# Patient Record
Sex: FEMALE | Race: WHITE | NOT HISPANIC OR LATINO | Employment: FULL TIME | URBAN - METROPOLITAN AREA
[De-identification: names, ages, dates, MRNs, and addresses within clinical notes are randomized per-mention and may not be internally consistent; named-entity substitution may affect disease eponyms.]

---

## 2018-03-29 ENCOUNTER — OFFICE VISIT (OUTPATIENT)
Dept: OBSTETRICS AND GYNECOLOGY | Facility: CLINIC | Age: 23
End: 2018-03-29

## 2018-03-29 VITALS
WEIGHT: 166.4 LBS | SYSTOLIC BLOOD PRESSURE: 122 MMHG | HEIGHT: 66 IN | DIASTOLIC BLOOD PRESSURE: 64 MMHG | BODY MASS INDEX: 26.74 KG/M2

## 2018-03-29 DIAGNOSIS — Z01.419 ENCOUNTER FOR GYNECOLOGICAL EXAMINATION WITHOUT ABNORMAL FINDING: Primary | ICD-10-CM

## 2018-03-29 DIAGNOSIS — Z87.42 HISTORY OF ABNORMAL CERVICAL PAP SMEAR: ICD-10-CM

## 2018-03-29 PROCEDURE — 99395 PREV VISIT EST AGE 18-39: CPT | Performed by: OBSTETRICS & GYNECOLOGY

## 2018-03-29 RX ORDER — NORETHINDRONE ACETATE AND ETHINYL ESTRADIOL 1.5-30(21)
KIT ORAL DAILY
Refills: 5 | COMMUNITY
Start: 2018-03-02

## 2018-03-29 RX ORDER — SULFAMETHOXAZOLE AND TRIMETHOPRIM 800; 160 MG/1; MG/1
TABLET ORAL
Refills: 3 | COMMUNITY
Start: 2018-02-28 | End: 2022-11-21

## 2018-03-29 NOTE — PROGRESS NOTES
Subjective    Chief Complaint   Patient presents with   • Gynecologic Exam     ae, reg periods, had abnormal pap but never followed up, tired to get preg x 9 months       History of Present Illness    Jessica Chin is a 22 y.o. female who presents for annual exam.  Patient currently on Microgestin by PCP and not attempting pregnancy.  She reportedly had an abnormal Pap smear 3 years ago but didn't  come back for follow-up.  Nonsmoker.      Obstetric History:  OB History     No data available         Menstrual History:     No LMP recorded.       Past Medical History:   Diagnosis Date   • Abnormal Pap smear of cervix      Family History   Problem Relation Age of Onset   • Diabetes Paternal Grandfather    • Diabetes Paternal Grandmother        The following portions of the patient's history were reviewed and updated as appropriate: allergies, current medications, past family history, past medical history, past social history, past surgical history and problem list.    Review of Systems   Constitutional: Negative.  Negative for fever and unexpected weight change.   HENT: Negative.    Respiratory: Negative for shortness of breath and wheezing.    Cardiovascular: Negative for chest pain, palpitations and leg swelling.   Gastrointestinal: Negative for abdominal pain, anal bleeding and blood in stool.   Genitourinary: Negative for dysuria, pelvic pain, urgency, vaginal bleeding, vaginal discharge and vaginal pain.   Skin: Negative.    Neurological: Negative.    Hematological: Negative.  Negative for adenopathy.   Psychiatric/Behavioral: Negative.  Negative for dysphoric mood. The patient is not nervous/anxious.             Objective   Physical Exam   Constitutional: She is oriented to person, place, and time. Vital signs are normal. She appears well-developed and well-nourished.   HENT:   Head: Normocephalic.   Neck: Trachea normal. No tracheal deviation present. No thyromegaly present.   Cardiovascular: Normal rate,  "regular rhythm and normal heart sounds.    No murmur heard.  Pulmonary/Chest: Effort normal and breath sounds normal.   Breasts without masses, tenderness or nipple discharge   Abdominal: Soft. Normal appearance. She exhibits no mass. There is no hepatosplenomegaly. There is no tenderness. No hernia.   Genitourinary: Rectum normal, vagina normal and uterus normal. Uterus is not enlarged and not tender. Cervix exhibits no motion tenderness. Right adnexum displays no mass and no tenderness. Left adnexum displays no mass and no tenderness. No vaginal discharge found.   Genitourinary Comments: External genitalia normal    Lymphadenopathy:     She has no cervical adenopathy.     She has no axillary adenopathy.   Neurological: She is alert and oriented to person, place, and time.   Skin: Skin is warm and dry. No rash noted.   Psychiatric: She has a normal mood and affect. Her behavior is normal. Cognition and memory are normal.       /64   Ht 167.6 cm (66\")   Wt 75.5 kg (166 lb 6.4 oz)   BMI 26.86 kg/m²     Assessment/Plan   Jessica was seen today for gynecologic exam.    Diagnoses and all orders for this visit:    Encounter for gynecological examination without abnormal finding  -     IGP,rfx Aptima HPV All Pth    History of abnormal cervical Pap smear  -     IGP,rfx Aptima HPV All Pth        RTO 6 months     Counseled about the importance of routine Pap smears in follow-up for abnormal Paps.  If this Pap is negative we will do a follow-up in 6 months.  If this is positive this Pap or if last Pap showed high-grade dysplasia she will need a colposcopy.  Also counseled about fertility issues which she currently does not have an really should not be addressed unless she is having problems conceiving.         "

## 2018-04-03 LAB
CONV .: NORMAL
CYTOLOGIST CVX/VAG CYTO: NORMAL
CYTOLOGY CVX/VAG DOC THIN PREP: NORMAL
DX ICD CODE: NORMAL
HIV 1 & 2 AB SER-IMP: NORMAL
OTHER STN SPEC: NORMAL
PATH REPORT.FINAL DX SPEC: NORMAL
STAT OF ADQ CVX/VAG CYTO-IMP: NORMAL

## 2022-09-25 ENCOUNTER — HOSPITAL ENCOUNTER (EMERGENCY)
Facility: HOSPITAL | Age: 27
Discharge: HOME | End: 2022-09-25
Attending: EMERGENCY MEDICINE | Admitting: EMERGENCY MEDICINE

## 2022-09-25 VITALS
SYSTOLIC BLOOD PRESSURE: 135 MMHG | TEMPERATURE: 98.1 F | DIASTOLIC BLOOD PRESSURE: 79 MMHG | HEART RATE: 71 BPM | RESPIRATION RATE: 16 BRPM | WEIGHT: 140.8 LBS | BODY MASS INDEX: 23.46 KG/M2 | OXYGEN SATURATION: 100 % | HEIGHT: 65 IN

## 2022-09-25 DIAGNOSIS — S61.411A LACERATION OF RIGHT PALM, INITIAL ENCOUNTER: Primary | ICD-10-CM

## 2022-09-25 DIAGNOSIS — M79.641 RIGHT HAND PAIN: ICD-10-CM

## 2022-09-25 PROCEDURE — 99282 EMERGENCY DEPT VISIT SF MDM: CPT

## 2022-09-25 NOTE — ED PROVIDER NOTES
Subjective   History of Present Illness  Chief Complaint: Aspiration    Patient is a 27-year-old  female with no significant past medical history presents to the ER with complaints of laceration to the right thumb.  Patient states that she was making breakfast this morning, trying to cut an avocado when the knife slipped in stabbed her in the hand.  She describes as a constant throbbing pain that she rates a 7/10.  She denies any other injury.  Patient has full range of motion of all digits on the right hand.  No numbness tingling.  Patient states that she has had a tetanus shot within the last 5 years.  No other complaints at this time    Location: Right palm    Quality: Throbbing    Duration: Today    Timing: Constant    Severity: Mild to moderate    Associated Symptoms: Laceration    PCP: Sher Hardy    History provided by:  Patient      Review of Systems   Constitutional: Negative for chills and fever.   HENT: Negative for sore throat and trouble swallowing.    Eyes: Negative.    Respiratory: Negative for shortness of breath and wheezing.    Cardiovascular: Negative for chest pain.   Gastrointestinal: Negative for abdominal pain, diarrhea, nausea and vomiting.   Endocrine: Negative.    Genitourinary: Negative for dysuria.   Skin: Positive for wound. Negative for rash.   Allergic/Immunologic: Negative.    Neurological: Negative for headaches.   Psychiatric/Behavioral: Negative for behavioral problems.   All other systems reviewed and are negative.      Past Medical History:   Diagnosis Date   • Abnormal Pap smear of cervix        Allergies   Allergen Reactions   • Azithromycin Anaphylaxis and Swelling     Had to be hospitalized with tongue and throat swelling     • Latex Hives       Past Surgical History:   Procedure Laterality Date   • TONSILLECTOMY         Family History   Problem Relation Age of Onset   • Diabetes Paternal Grandfather    • Diabetes Paternal Grandmother        Social History      Socioeconomic History   • Marital status: Single   Tobacco Use   • Smoking status: Never Smoker   • Smokeless tobacco: Never Used   Substance and Sexual Activity   • Alcohol use: No   • Drug use: No   • Sexual activity: Yes     Partners: Male     Birth control/protection: Pill           Objective   Physical Exam  Vitals and nursing note reviewed.   Constitutional:       Appearance: Normal appearance. She is well-developed and normal weight. She is not ill-appearing or toxic-appearing.   HENT:      Head: Normocephalic and atraumatic.   Eyes:      Pupils: Pupils are equal, round, and reactive to light.   Cardiovascular:      Rate and Rhythm: Normal rate and regular rhythm.      Heart sounds: Normal heart sounds.   Pulmonary:      Effort: Pulmonary effort is normal. No respiratory distress.      Breath sounds: Normal breath sounds. No wheezing.   Abdominal:      General: Bowel sounds are normal. There is no distension.      Palpations: Abdomen is soft.      Tenderness: There is no abdominal tenderness.   Skin:     General: Skin is warm and dry.      Capillary Refill: Capillary refill takes less than 2 seconds.      Findings: No erythema or rash.      Comments: 1 cm laceration to the palmar aspect the right hand at the base of the thenar eminence   Neurological:      General: No focal deficit present.      Mental Status: She is alert and oriented to person, place, and time.      Cranial Nerves: No cranial nerve deficit.   Psychiatric:         Mood and Affect: Mood normal.         Behavior: Behavior normal.         Laceration Repair    Date/Time: 9/25/2022 11:01 AM  Performed by: Milagro Landrum PA  Authorized by: Jabari Marie MD     Consent:     Consent obtained:  Verbal    Consent given by:  Patient    Risks discussed:  Infection, pain and poor cosmetic result  Universal protocol:     Site/side marked: yes      Immediately prior to procedure, a time out was called: yes      Patient identity confirmed:  Verbally  "with patient  Anesthesia:     Anesthesia method:  Local infiltration    Local anesthetic:  Lidocaine 1% w/o epi  Laceration details:     Location:  Hand    Hand location:  R palm    Length (cm):  1    Depth (mm):  2  Pre-procedure details:     Preparation:  Patient was prepped and draped in usual sterile fashion  Skin repair:     Repair method:  Sutures    Suture size:  5-0    Suture material:  Nylon    Suture technique:  Simple interrupted    Number of sutures:  3  Approximation:     Approximation:  Close  Repair type:     Repair type:  Simple  Post-procedure details:     Dressing:  Antibiotic ointment and non-adherent dressing    Procedure completion:  Tolerated               ED Course     /79   Pulse 71   Temp 98.1 °F (36.7 °C) (Oral)   Resp 16   Ht 165.1 cm (65\")   Wt 63.9 kg (140 lb 12.8 oz)   LMP 08/25/2022 (Approximate)   SpO2 100%   BMI 23.43 kg/m²   Labs Reviewed - No data to display  Medications - No data to display  No radiology results for the last day                                         MDM  Number of Diagnoses or Management Options  Laceration of right palm, initial encounter  Right hand pain  Diagnosis management comments: MEDICAL DECISION  Epic Chart Review: No recent admissions  Comorbidities: Patient denies  Differentials: Laceration, foreign body, contusion; this list is not all inclusive and does not constitute the entirety of considered causes  Radiology interpretation:  Images reviewed by me and interpreted by radiologist, not warranted  Lab interpretation:  Labs viewed by me significant for, not warranted    While in the ED appropriate PPE was worn during exam and throughout all encounters with the patient.  Wound was cleansed thoroughly per nursing staff.  Patient states she is up-to-date on tetanus.  Laceration repair performed as indicated in the above procedure note.  Patient tolerated procedure well.  Patient have sutures in place for 7 to 10 days.     Discharge plan and " instructions were discussed with the patient who verbalized understanding and is in agreement with the plan, all questions were answered at this time.  Patient is aware of signs symptoms that would require immediate return to the emergency room.  Patient understands importance of following up with primary care provider for further evaluation and worsening concerns as well as blood pressure recheck in the next 4 weeks.    Patient was discharged in improved stable condition with an upright steady gait.    Patient Progress  Patient progress: stable      Final diagnoses:   Laceration of right palm, initial encounter   Right hand pain       ED Disposition  ED Disposition     ED Disposition   Discharge    Condition   Stable    Comment   --             Sher Hardy MD  [facility] [address] [place] [address]  [phone]    Schedule an appointment as soon as possible for a visit in 2 days  As needed, If symptoms worsen         Medication List      No changes were made to your prescriptions during this visit.          Milagro Landrum PA  09/25/22 1142

## 2022-09-25 NOTE — DISCHARGE INSTRUCTIONS
Take Tylenol or ibuprofen as needed for pain.  Keep clean, wash with soap and water at least once per day.  Keep covered during work.    Have sutures removed in 7 to 10 days by primary care urgent care or [facility].  May return to the ER as well if needed.  Watch for signs of infection.  This includes redness, swelling pain or drainage.    Return to the ER for new or worsening symptoms

## 2022-11-21 ENCOUNTER — HOSPITAL ENCOUNTER (EMERGENCY)
Facility: HOSPITAL | Age: 27
Discharge: HOME | End: 2022-11-21
Attending: EMERGENCY MEDICINE | Admitting: EMERGENCY MEDICINE

## 2022-11-21 VITALS
TEMPERATURE: 98 F | BODY MASS INDEX: 24.17 KG/M2 | RESPIRATION RATE: 20 BRPM | SYSTOLIC BLOOD PRESSURE: 120 MMHG | HEIGHT: 65 IN | WEIGHT: 145.06 LBS | OXYGEN SATURATION: 98 % | DIASTOLIC BLOOD PRESSURE: 80 MMHG | HEART RATE: 80 BPM

## 2022-11-21 DIAGNOSIS — T14.8XXA ANIMAL BITE: Primary | ICD-10-CM

## 2022-11-21 PROCEDURE — 99282 EMERGENCY DEPT VISIT SF MDM: CPT

## 2022-11-21 PROCEDURE — 90715 TDAP VACCINE 7 YRS/> IM: CPT | Performed by: PHYSICIAN ASSISTANT

## 2022-11-21 PROCEDURE — 90471 IMMUNIZATION ADMIN: CPT | Performed by: PHYSICIAN ASSISTANT

## 2022-11-21 PROCEDURE — 25010000002 TETANUS-DIPHTH-ACELL PERTUSSIS 5-2.5-18.5 LF-MCG/0.5 SUSPENSION PREFILLED SYRINGE: Performed by: PHYSICIAN ASSISTANT

## 2022-11-21 RX ORDER — AMOXICILLIN AND CLAVULANATE POTASSIUM 875; 125 MG/1; MG/1
1 TABLET, FILM COATED ORAL 2 TIMES DAILY
Qty: 14 TABLET | Refills: 0 | Status: SHIPPED | OUTPATIENT
Start: 2022-11-21 | End: 2022-11-28

## 2022-11-21 RX ORDER — NAPROXEN 500 MG/1
500 TABLET ORAL 2 TIMES DAILY WITH MEALS
Qty: 20 TABLET | Refills: 0 | Status: SHIPPED | OUTPATIENT
Start: 2022-11-21

## 2022-11-21 RX ADMIN — TETANUS TOXOID, REDUCED DIPHTHERIA TOXOID AND ACELLULAR PERTUSSIS VACCINE, ADSORBED 0.5 ML: 5; 2.5; 8; 8; 2.5 SUSPENSION INTRAMUSCULAR at 18:31

## 2022-11-21 NOTE — DISCHARGE INSTRUCTIONS
Clean wound daily with soap and water pat dry and apply antibiotic ointment as desired.  Look for signs of infection including increased redness purulent drainage or fever.    Take naproxen as needed for pain.    Take antibiotic as directed.  Be sure to take full course.    Follow-up with your primary care provider in 3-5 days.  If you do not have a primary care provider call [phone] for help in finding one, or you may follow up with [facility] at [phone].    Return to ED for any new or worsening symptoms

## 2022-11-21 NOTE — ED PROVIDER NOTES
Subjective   History of Present Illness  Patient is a 27-year-old female presents to the ED with reports of an animal bite.  Patient states that she is a  and was going to a house when a dog bit her in the face.  Patient reports a laceration on her nose and in her right nare.  Patient denies any bleeding at this time.  She reports minimal pain.  Patient reports her tetanus vaccination is not up-to-date.  Patient denies any other injury from the incident.        Review of Systems   Constitutional: Negative.    HENT: Negative for congestion, ear discharge, ear pain, facial swelling and rhinorrhea.    Respiratory: Negative for shortness of breath.    Cardiovascular: Negative for chest pain.   Gastrointestinal: Negative for nausea and vomiting.   Skin: Positive for wound.   Neurological: Negative for dizziness, light-headedness and headaches.       Past Medical History:   Diagnosis Date   • Abnormal Pap smear of cervix        Allergies   Allergen Reactions   • Azithromycin Anaphylaxis and Swelling     Had to be hospitalized with tongue and throat swelling     • Latex Hives       Past Surgical History:   Procedure Laterality Date   • TONSILLECTOMY         Family History   Problem Relation Age of Onset   • Diabetes Paternal Grandfather    • Diabetes Paternal Grandmother        Social History     Socioeconomic History   • Marital status: Single   Tobacco Use   • Smoking status: Never   • Smokeless tobacco: Never   Substance and Sexual Activity   • Alcohol use: No   • Drug use: No   • Sexual activity: Yes     Partners: Male     Birth control/protection: Pill           Objective   Physical Exam  Vitals and nursing note reviewed.   Constitutional:       General: She is not in acute distress.     Appearance: She is well-developed. She is not ill-appearing, toxic-appearing or diaphoretic.   HENT:      Head: Normocephalic. Laceration present. No raccoon eyes or Scales's sign.        Mouth/Throat:      Mouth: Mucous  "membranes are moist.      Pharynx: Oropharynx is clear.   Eyes:      General: No scleral icterus.     Extraocular Movements: Extraocular movements intact.      Pupils: Pupils are equal, round, and reactive to light.   Cardiovascular:      Rate and Rhythm: Normal rate and regular rhythm.      Heart sounds: No murmur heard.    No friction rub. No gallop.   Pulmonary:      Effort: Pulmonary effort is normal. No respiratory distress.      Breath sounds: Normal breath sounds. No stridor. No wheezing, rhonchi or rales.   Chest:      Chest wall: No tenderness.   Abdominal:      General: Bowel sounds are normal. There is no distension. There are no signs of injury.      Palpations: Abdomen is soft. There is no fluid wave, hepatomegaly, splenomegaly or mass.      Tenderness: There is no abdominal tenderness. There is no right CVA tenderness, left CVA tenderness, guarding or rebound.      Hernia: No hernia is present.   Skin:     General: Skin is warm.      Capillary Refill: Capillary refill takes less than 2 seconds.      Coloration: Skin is not cyanotic, jaundiced or pale.      Findings: No rash.   Neurological:      General: No focal deficit present.      Mental Status: She is alert and oriented to person, place, and time.   Psychiatric:         Mood and Affect: Mood normal.         Behavior: Behavior normal.         Procedures           ED Course    /81 (BP Location: Left arm, Patient Position: Sitting)   Pulse 74   Temp 99.5 °F (37.5 °C) (Oral)   Resp 20   Ht 165.1 cm (65\")   Wt 65.8 kg (145 lb 1 oz)   LMP 11/01/2022   SpO2 98%   BMI 24.14 kg/m²   Medications   Tetanus-Diphth-Acell Pertussis (BOOSTRIX) injection 0.5 mL (0.5 mL Intramuscular Given 11/21/22 1831)     Labs Reviewed - No data to display  No radiology results for the last day                                         MDM  Number of Diagnoses or Management Options  Animal bite  Diagnosis management comments: Chart Review:  Comorbidity: As per past " medical history  Disposition/Treatment:  Appropriate PPE was worn during exam and throughout all encounters with the patient.  Patient had lacerations were cleaned and tetanus vaccination was updated while in the ED.  No need for laceration repair at this time.  Patient was given wound care instructions along with signs and symptoms to return to the ED.  Patient will be sent home on Augmentin and naproxen.  Patient voiced understanding of discharge along with signs and symptoms to return to the ED.  We did discuss rabies vaccination as this was someone else's dog however she declined at this time.  We discussed risks and benefits of this.  Patient was in agreement with plan.  Patient voiced understanding and discharge along with signs and symptoms to return.  This document is intended for medical expert use only. Reading of this document by patients and/or patient's family without participating medical staff guidance may result in misinterpretation and unintended morbidity.  Any interpretation of such data is the responsibility of the patient and/or family member responsible for the patient in concert with their primary or specialist providers, not to be left for sources of online searches such as [employer], [employer] or similar queries. Relying on these approaches to knowledge may result in misinterpretation, misguided goals of care and even death should patients or family members try recommendations outside of the realm of professional medical care in a supervised inpatient environment.             Final diagnoses:   Animal bite       ED Disposition  ED Disposition     ED Disposition   Discharge    Condition   Stable    Comment   --             Sher Hardy MD  [facility] [address] [place] [id number] [phone]    Schedule an appointment as soon as possible for a visit in 3 days      [facility] EMERGENCY DEPARTMENT  [facility] [address] [phone]  Go to   If  symptoms worsen         Medication List      New Prescriptions    amoxicillin-clavulanate 875-125 MG per tablet  Commonly known as: AUGMENTIN  Take 1 tablet by mouth 2 (Two) Times a Day for 7 days.     naproxen 500 MG tablet  Commonly known as: NAPROSYN  Take 1 tablet by mouth 2 (Two) Times a Day With Meals.        Stop    sulfamethoxazole-trimethoprim 800-160 MG per tablet  Commonly known as: BACTRIM DS,SEPTRA DS           Where to Get Your Medications      These medications were sent to [id number] - [place], [place] - [address] AT [place] [address] [address] - [phone] [facility] [phone]   [address] [place] [address] [place] [address]    Phone: [phone]   · amoxicillin-clavulanate 875-125 MG per tablet  · naproxen 500 MG tablet          Mahesh Weber PA  11/21/22 2830

## 2023-05-09 ENCOUNTER — INITIAL PRENATAL (OUTPATIENT)
Dept: OBSTETRICS AND GYNECOLOGY | Facility: CLINIC | Age: 28
End: 2023-05-09
Payer: OTHER GOVERNMENT

## 2023-05-09 VITALS — BODY MASS INDEX: 22.96 KG/M2 | DIASTOLIC BLOOD PRESSURE: 69 MMHG | SYSTOLIC BLOOD PRESSURE: 111 MMHG | WEIGHT: 138 LBS

## 2023-05-09 DIAGNOSIS — Z34.01 ENCOUNTER FOR SUPERVISION OF NORMAL FIRST PREGNANCY IN FIRST TRIMESTER: Primary | ICD-10-CM

## 2023-05-09 RX ORDER — ONDANSETRON 4 MG/1
4 TABLET, ORALLY DISINTEGRATING ORAL EVERY 8 HOURS PRN
Qty: 24 TABLET | Refills: 1 | Status: SHIPPED | OUTPATIENT
Start: 2023-05-09

## 2023-05-09 RX ORDER — ONDANSETRON 4 MG/1
TABLET, ORALLY DISINTEGRATING ORAL
COMMUNITY
Start: 2023-04-26 | End: 2023-05-09 | Stop reason: SDUPTHER

## 2023-05-09 NOTE — PROGRESS NOTES
CC initial prenatal visit for this 28-year-old primigravida using LMP EDC 12/23/2023 pending ultrasound.  Patient reportedly was seen in the emergency room at [facility] last week and had an ultrasound with a fetal heart rate of 150 and the ultrasound appears consistent with LMP.  We will order another one and try to get those records.  She was seen then for threatened miscarriage but her bleeding is stopped.  She does not want CF or Materna 21 testing.  She does have a history of abnormal Pap smears but her last Pap 5 years ago was negative.  She does need Zofran for nausea.  Review of Systems - ENT ROS: negative  Hematological and Lymphatic ROS: negative  Endocrine ROS: negative  Breast ROS: negative  Respiratory ROS: negative  Cardiovascular ROS: negative  Gastrointestinal ROS: negative except nausea  Genito-Urinary ROS: negative  Musculoskeletal ROS: negative  Neurological ROS: negative  Dermatological ROS: negative  Assessment.  7 weeks 3 days gestation with recent vaginal bleeding that has stopped.  Nausea.  Plan.  Routine labs, Pap performed with GC chlamydia, ultrasound for dating and viability, return to office 4 weeks.

## 2023-05-10 LAB
ABO GROUP BLD: ABNORMAL
BASOPHILS # BLD AUTO: 0.1 X10E3/UL (ref 0–0.2)
BASOPHILS NFR BLD AUTO: 0 %
BLD GP AB SCN SERPL QL: NEGATIVE
EOSINOPHIL # BLD AUTO: 0.3 X10E3/UL (ref 0–0.4)
EOSINOPHIL NFR BLD AUTO: 2 %
ERYTHROCYTE [DISTWIDTH] IN BLOOD BY AUTOMATED COUNT: 11.8 % (ref 11.7–15.4)
EST. AVERAGE GLUCOSE BLD GHB EST-MCNC: 97 MG/DL
FT4I SERPL CALC-MCNC: 1.7 (ref 1.2–4.9)
HBA1C MFR BLD: 5 % (ref 4.8–5.6)
HBV SURFACE AG SERPL QL IA: NEGATIVE
HCT VFR BLD AUTO: 37.6 % (ref 34–46.6)
HCV IGG SERPL QL IA: NON REACTIVE
HGB BLD-MCNC: 12.5 G/DL (ref 11.1–15.9)
HIV 1+2 AB+HIV1 P24 AG SERPL QL IA: NON REACTIVE
IMM GRANULOCYTES # BLD AUTO: 0.1 X10E3/UL (ref 0–0.1)
IMM GRANULOCYTES NFR BLD AUTO: 1 %
LYMPHOCYTES # BLD AUTO: 3.1 X10E3/UL (ref 0.7–3.1)
LYMPHOCYTES NFR BLD AUTO: 22 %
MCH RBC QN AUTO: 29.6 PG (ref 26.6–33)
MCHC RBC AUTO-ENTMCNC: 33.2 G/DL (ref 31.5–35.7)
MCV RBC AUTO: 89 FL (ref 79–97)
MONOCYTES # BLD AUTO: 1 X10E3/UL (ref 0.1–0.9)
MONOCYTES NFR BLD AUTO: 7 %
NEUTROPHILS # BLD AUTO: 9.5 X10E3/UL (ref 1.4–7)
NEUTROPHILS NFR BLD AUTO: 68 %
PLATELET # BLD AUTO: 291 X10E3/UL (ref 150–450)
RBC # BLD AUTO: 4.22 X10E6/UL (ref 3.77–5.28)
RH BLD: POSITIVE
RPR SER QL: NON REACTIVE
RUBV IGG SERPL IA-ACNC: 2.48 INDEX
T3RU NFR SERPL: 28 % (ref 24–39)
T4 SERPL-MCNC: 5.9 UG/DL (ref 4.5–12)
TSH SERPL DL<=0.005 MIU/L-ACNC: 1.11 UIU/ML (ref 0.45–4.5)
WBC # BLD AUTO: 14.1 X10E3/UL (ref 3.4–10.8)

## 2023-05-11 LAB
AMPHETAMINES UR QL SCN: NEGATIVE NG/ML
BACTERIA UR CULT: NORMAL
BACTERIA UR CULT: NORMAL
BARBITURATES UR QL SCN: NEGATIVE NG/ML
BENZODIAZ UR QL: NEGATIVE NG/ML
BZE UR QL: NEGATIVE NG/ML
CANNABINOIDS UR QL SCN: NEGATIVE NG/ML
METHADONE UR QL SCN: NEGATIVE NG/ML
OPIATES UR QL: NEGATIVE NG/ML
PCP UR QL: NEGATIVE NG/ML
PROPOXYPH UR QL SCN: NEGATIVE NG/ML

## 2023-05-16 LAB
C TRACH RRNA CVX QL NAA+PROBE: NEGATIVE
CONV .: NORMAL
CYTOLOGIST CVX/VAG CYTO: NORMAL
CYTOLOGY CVX/VAG DOC CYTO: NORMAL
CYTOLOGY CVX/VAG DOC THIN PREP: NORMAL
DX ICD CODE: NORMAL
HIV 1 & 2 AB SER-IMP: NORMAL
N GONORRHOEA RRNA CVX QL NAA+PROBE: NEGATIVE
OTHER STN SPEC: NORMAL
STAT OF ADQ CVX/VAG CYTO-IMP: NORMAL

## 2023-05-30 ENCOUNTER — TELEPHONE (OUTPATIENT)
Dept: OBSTETRICS AND GYNECOLOGY | Facility: CLINIC | Age: 28
End: 2023-05-30

## 2023-05-30 RX ORDER — PROMETHAZINE HYDROCHLORIDE 25 MG/1
25 TABLET ORAL EVERY 6 HOURS PRN
Qty: 20 TABLET | Refills: 1 | Status: SHIPPED | OUTPATIENT
Start: 2023-05-30

## 2023-05-30 NOTE — TELEPHONE ENCOUNTER
Please see HUB message. Pt experiencing nausea and vomiting, would like Phenergan please sent to pharmacy if possible.     Pharmacy confirmed - [id number] - [address] - [phone] [facility] [phone] FX    Thank you,   Katiana

## 2023-06-07 ENCOUNTER — ROUTINE PRENATAL (OUTPATIENT)
Dept: OBSTETRICS AND GYNECOLOGY | Facility: CLINIC | Age: 28
End: 2023-06-07
Payer: OTHER GOVERNMENT

## 2023-06-07 VITALS — WEIGHT: 139 LBS | BODY MASS INDEX: 23.13 KG/M2 | DIASTOLIC BLOOD PRESSURE: 78 MMHG | SYSTOLIC BLOOD PRESSURE: 147 MMHG

## 2023-06-07 DIAGNOSIS — Z34.01 ENCOUNTER FOR SUPERVISION OF NORMAL FIRST PREGNANCY IN FIRST TRIMESTER: Primary | ICD-10-CM

## 2023-06-07 DIAGNOSIS — Z83.49 FAMILY HISTORY OF CYSTIC FIBROSIS: ICD-10-CM

## 2023-06-07 LAB
GLUCOSE UR STRIP-MCNC: NEGATIVE MG/DL
PROT UR STRIP-MCNC: NEGATIVE MG/DL

## 2023-06-07 NOTE — PROGRESS NOTES
CC follow-up OB visit.  Positive fetal heart rate today.  Patient wanting Materna 21 and CF testing as mother was CF carrier.  No current problems.  Assessment.  11 weeks 4 days gestation, family history positive CF carrier  Plan.  Materna 21, CF testing, return to office 4 weeks.

## 2023-06-13 ENCOUNTER — TELEPHONE (OUTPATIENT)
Dept: OBSTETRICS AND GYNECOLOGY | Facility: CLINIC | Age: 28
End: 2023-06-13
Payer: OTHER GOVERNMENT

## 2023-06-13 NOTE — TELEPHONE ENCOUNTER
----- Message from Jabari Santa MD sent at 6/13/2023 12:42 PM EDT -----  Please tell patient that her Materna 21 was low risk for Down's.  Its a girl baby if she wants to know.  FREDDY

## 2023-06-16 LAB
CITATION REF LAB TEST: NORMAL
GENDER IDENTITY: NORMAL
GENE DIS ANL CARRIER INTERP-IMP: NORMAL
GENE STUDIED ID: NORMAL
GENETIC SCN SPEC: NORMAL
LAB DIRECTOR NAME PROVIDER: NORMAL
Lab: NORMAL
REASON FOR REFERRAL (NARRATIVE): NORMAL
RECOMMENDATION PATIENT DOC-IMP: NORMAL
REF LAB TEST METHOD: NORMAL
SERVICE CMNT-IMP: NORMAL
SPECIMEN SOURCE: NORMAL

## 2023-07-03 ENCOUNTER — TELEPHONE (OUTPATIENT)
Dept: OBSTETRICS AND GYNECOLOGY | Facility: CLINIC | Age: 28
End: 2023-07-03

## 2023-07-31 ENCOUNTER — ROUTINE PRENATAL (OUTPATIENT)
Dept: OBSTETRICS AND GYNECOLOGY | Facility: CLINIC | Age: 28
End: 2023-07-31
Payer: OTHER GOVERNMENT

## 2023-07-31 VITALS — DIASTOLIC BLOOD PRESSURE: 69 MMHG | SYSTOLIC BLOOD PRESSURE: 107 MMHG | BODY MASS INDEX: 24.66 KG/M2 | WEIGHT: 148.2 LBS

## 2023-07-31 DIAGNOSIS — Z3A.19 19 WEEKS GESTATION OF PREGNANCY: Primary | ICD-10-CM

## 2023-07-31 LAB
GLUCOSE UR STRIP-MCNC: NEGATIVE MG/DL
PROT UR STRIP-MCNC: NEGATIVE MG/DL

## 2023-07-31 PROCEDURE — 0502F SUBSEQUENT PRENATAL CARE: CPT | Performed by: OBSTETRICS & GYNECOLOGY

## 2023-07-31 RX ORDER — PRENATAL VIT/IRON FUM/FOLIC AC 27MG-0.8MG
TABLET ORAL DAILY
COMMUNITY

## 2023-08-03 ENCOUNTER — PATIENT MESSAGE (OUTPATIENT)
Dept: OBSTETRICS AND GYNECOLOGY | Facility: CLINIC | Age: 28
End: 2023-08-03
Payer: OTHER GOVERNMENT

## 2023-08-23 ENCOUNTER — CLINICAL SUPPORT (OUTPATIENT)
Dept: OBSTETRICS AND GYNECOLOGY | Facility: CLINIC | Age: 28
End: 2023-08-23
Payer: OTHER GOVERNMENT

## 2023-08-23 ENCOUNTER — TELEPHONE (OUTPATIENT)
Dept: OBSTETRICS AND GYNECOLOGY | Facility: CLINIC | Age: 28
End: 2023-08-23

## 2023-08-23 VITALS
HEART RATE: 98 BPM | WEIGHT: 157.6 LBS | BODY MASS INDEX: 26.26 KG/M2 | DIASTOLIC BLOOD PRESSURE: 70 MMHG | SYSTOLIC BLOOD PRESSURE: 121 MMHG | HEIGHT: 65 IN

## 2023-08-23 DIAGNOSIS — R30.0 DYSURIA: Primary | ICD-10-CM

## 2023-08-23 DIAGNOSIS — O26.892 DYSURIA IN PREGNANCY IN SECOND TRIMESTER: ICD-10-CM

## 2023-08-23 DIAGNOSIS — R30.0 DYSURIA IN PREGNANCY IN SECOND TRIMESTER: ICD-10-CM

## 2023-08-23 LAB
BILIRUB BLD-MCNC: NEGATIVE MG/DL
CLARITY, POC: CLEAR
COLOR UR: YELLOW
GLUCOSE UR STRIP-MCNC: NEGATIVE MG/DL
KETONES UR QL: NEGATIVE
LEUKOCYTE EST, POC: NEGATIVE
NITRITE UR-MCNC: NEGATIVE MG/ML
PH UR: 7 [PH] (ref 5–8)
PROT UR STRIP-MCNC: NEGATIVE MG/DL
RBC # UR STRIP: NEGATIVE /UL
SP GR UR: 1.02 (ref 1–1.03)
UROBILINOGEN UR QL: NORMAL

## 2023-08-23 NOTE — PROGRESS NOTES
Patient is 22. 4 weeks gestation. Patient c/o left flank pain and urine has odor. Patient here to leave urine sample. No history of kidney stones. Urinalysis sent to Dr. Lindsey. Pharmacy and allergies confirmed. Discussed with patient if flank pain worsened, fever or any other concerning problems go to L&D for further evaluation.

## 2023-08-23 NOTE — TELEPHONE ENCOUNTER
Yes, please call and let her know that her urine is negative for any infection.  She needs to hydrate and she may use Tylenol or heat for the discomfort.  If the pain is becoming worse or she has any associated fevers, she needs to be seen on labor and delivery.

## 2023-08-23 NOTE — TELEPHONE ENCOUNTER
Dr. Santa pt is coming in today to leave urine sample due to possible uti. Could you please put in order?    Pt also requesting work note since she is having to leave early. Would this be ok to provide pt?    Please advise,   Thank you

## 2023-08-25 LAB
BACTERIA UR CULT: NORMAL
BACTERIA UR CULT: NORMAL

## 2023-09-07 ENCOUNTER — ROUTINE PRENATAL (OUTPATIENT)
Dept: OBSTETRICS AND GYNECOLOGY | Facility: CLINIC | Age: 28
End: 2023-09-07
Payer: OTHER GOVERNMENT

## 2023-09-07 VITALS — SYSTOLIC BLOOD PRESSURE: 114 MMHG | DIASTOLIC BLOOD PRESSURE: 75 MMHG | BODY MASS INDEX: 26.46 KG/M2 | WEIGHT: 159 LBS

## 2023-09-07 DIAGNOSIS — Z34.02 ENCOUNTER FOR SUPERVISION OF NORMAL FIRST PREGNANCY IN SECOND TRIMESTER: ICD-10-CM

## 2023-09-07 LAB
GLUCOSE UR STRIP-MCNC: NEGATIVE MG/DL
PROT UR STRIP-MCNC: NEGATIVE MG/DL

## 2023-09-07 NOTE — PROGRESS NOTES
CC OB follow-up visit.  Normal anatomy scan today.  Fetal heart rate 137.  O+ blood type.  Good fetal movement and growth.  No current problems.  Assessment.  24 weeks 5 days gestation with good fetal movement and growth.  Plan.  Return to office 4 weeks with 1 hour glucose.

## 2023-09-25 ENCOUNTER — TELEPHONE (OUTPATIENT)
Dept: OBSTETRICS AND GYNECOLOGY | Facility: CLINIC | Age: 28
End: 2023-09-25

## 2023-09-25 ENCOUNTER — HOSPITAL ENCOUNTER (EMERGENCY)
Facility: HOSPITAL | Age: 28
Discharge: HOME | End: 2023-09-25
Attending: OBSTETRICS & GYNECOLOGY | Admitting: OBSTETRICS & GYNECOLOGY
Payer: OTHER GOVERNMENT

## 2023-09-25 VITALS
SYSTOLIC BLOOD PRESSURE: 112 MMHG | OXYGEN SATURATION: 100 % | RESPIRATION RATE: 16 BRPM | TEMPERATURE: 99 F | HEART RATE: 92 BPM | DIASTOLIC BLOOD PRESSURE: 67 MMHG

## 2023-09-25 LAB
ALBUMIN SERPL-MCNC: 3.6 G/DL (ref 3.5–5.2)
ALBUMIN/GLOB SERPL: 1.6 G/DL
ALP SERPL-CCNC: 73 U/L (ref 39–117)
ALT SERPL W P-5'-P-CCNC: 18 U/L (ref 1–33)
ANION GAP SERPL CALCULATED.3IONS-SCNC: 11 MMOL/L (ref 5–15)
AST SERPL-CCNC: 18 U/L (ref 1–32)
BACTERIA UR QL AUTO: ABNORMAL /HPF
BASOPHILS # BLD AUTO: 0.04 10*3/MM3 (ref 0–0.2)
BASOPHILS NFR BLD AUTO: 0.3 % (ref 0–1.5)
BILIRUB SERPL-MCNC: 0.3 MG/DL (ref 0–1.2)
BILIRUB UR QL STRIP: NEGATIVE
BUN SERPL-MCNC: 6 MG/DL (ref 6–20)
BUN/CREAT SERPL: 13.3 (ref 7–25)
CALCIUM SPEC-SCNC: 8.8 MG/DL (ref 8.6–10.5)
CHLORIDE SERPL-SCNC: 105 MMOL/L (ref 98–107)
CLARITY UR: CLEAR
CO2 SERPL-SCNC: 22 MMOL/L (ref 22–29)
COLOR UR: YELLOW
CREAT SERPL-MCNC: 0.45 MG/DL (ref 0.57–1)
DEPRECATED RDW RBC AUTO: 39.9 FL (ref 37–54)
EGFRCR SERPLBLD CKD-EPI 2021: 136.3 ML/MIN/1.73
EOSINOPHIL # BLD AUTO: 0.19 10*3/MM3 (ref 0–0.4)
EOSINOPHIL NFR BLD AUTO: 1.4 % (ref 0.3–6.2)
ERYTHROCYTE [DISTWIDTH] IN BLOOD BY AUTOMATED COUNT: 12.2 % (ref 12.3–15.4)
GLOBULIN UR ELPH-MCNC: 2.2 GM/DL
GLUCOSE SERPL-MCNC: 103 MG/DL (ref 65–99)
GLUCOSE UR STRIP-MCNC: NEGATIVE MG/DL
HCT VFR BLD AUTO: 33.7 % (ref 34–46.6)
HGB BLD-MCNC: 11.5 G/DL (ref 12–15.9)
HGB UR QL STRIP.AUTO: NEGATIVE
HYALINE CASTS UR QL AUTO: ABNORMAL /LPF
IMM GRANULOCYTES # BLD AUTO: 0.27 10*3/MM3 (ref 0–0.05)
IMM GRANULOCYTES NFR BLD AUTO: 2 % (ref 0–0.5)
KETONES UR QL STRIP: NEGATIVE
LEUKOCYTE ESTERASE UR QL STRIP.AUTO: ABNORMAL
LYMPHOCYTES # BLD AUTO: 1.95 10*3/MM3 (ref 0.7–3.1)
LYMPHOCYTES NFR BLD AUTO: 14.2 % (ref 19.6–45.3)
MCH RBC QN AUTO: 30.7 PG (ref 26.6–33)
MCHC RBC AUTO-ENTMCNC: 34.1 G/DL (ref 31.5–35.7)
MCV RBC AUTO: 90.1 FL (ref 79–97)
MONOCYTES # BLD AUTO: 0.84 10*3/MM3 (ref 0.1–0.9)
MONOCYTES NFR BLD AUTO: 6.1 % (ref 5–12)
NEUTROPHILS NFR BLD AUTO: 10.48 10*3/MM3 (ref 1.7–7)
NEUTROPHILS NFR BLD AUTO: 76 % (ref 42.7–76)
NITRITE UR QL STRIP: NEGATIVE
NRBC BLD AUTO-RTO: 0 /100 WBC (ref 0–0.2)
PH UR STRIP.AUTO: 6.5 [PH] (ref 5–8)
PLATELET # BLD AUTO: 237 10*3/MM3 (ref 140–450)
PMV BLD AUTO: 9.3 FL (ref 6–12)
POTASSIUM SERPL-SCNC: 3.3 MMOL/L (ref 3.5–5.2)
PROT SERPL-MCNC: 5.8 G/DL (ref 6–8.5)
PROT UR QL STRIP: NEGATIVE
RBC # BLD AUTO: 3.74 10*6/MM3 (ref 3.77–5.28)
RBC # UR STRIP: ABNORMAL /HPF
REF LAB TEST METHOD: ABNORMAL
SODIUM SERPL-SCNC: 138 MMOL/L (ref 136–145)
SP GR UR STRIP: 1.01 (ref 1–1.03)
SQUAMOUS #/AREA URNS HPF: ABNORMAL /HPF
UROBILINOGEN UR QL STRIP: ABNORMAL
WBC # UR STRIP: ABNORMAL /HPF
WBC NRBC COR # BLD: 13.77 10*3/MM3 (ref 3.4–10.8)

## 2023-09-25 PROCEDURE — 85025 COMPLETE CBC W/AUTO DIFF WBC: CPT | Performed by: OBSTETRICS & GYNECOLOGY

## 2023-09-25 PROCEDURE — 99284 EMERGENCY DEPT VISIT MOD MDM: CPT | Performed by: OBSTETRICS & GYNECOLOGY

## 2023-09-25 PROCEDURE — 59025 FETAL NON-STRESS TEST: CPT | Performed by: OBSTETRICS & GYNECOLOGY

## 2023-09-25 PROCEDURE — 87086 URINE CULTURE/COLONY COUNT: CPT | Performed by: OBSTETRICS & GYNECOLOGY

## 2023-09-25 PROCEDURE — 80053 COMPREHEN METABOLIC PANEL: CPT | Performed by: OBSTETRICS & GYNECOLOGY

## 2023-09-25 PROCEDURE — 81001 URINALYSIS AUTO W/SCOPE: CPT | Performed by: OBSTETRICS & GYNECOLOGY

## 2023-09-25 RX ORDER — BUTALBITAL, ACETAMINOPHEN AND CAFFEINE 50; 325; 40 MG/1; MG/1; MG/1
2 TABLET ORAL ONCE
Status: COMPLETED | OUTPATIENT
Start: 2023-09-25 | End: 2023-09-25

## 2023-09-25 RX ADMIN — BUTALBITAL, ACETAMINOPHEN, AND CAFFEINE 2 TABLET: 50; 325; 40 TABLET ORAL at 15:33

## 2023-09-25 NOTE — TELEPHONE ENCOUNTER
OB pt calling with preeclampsia concerns due to headache and right upper abdomen pain for 2 weeks, swelling in hands and feet, and what she describes has seeing her heartbeat in vision. Pt requested to be seen today if possible, was sent to L&D at [facility].     Katiana

## 2023-09-25 NOTE — OBED NOTES
MEENU Note OB        Patient Name: Jessica Chin  YOB: 1995  MRN: [id number]  Admission Date: 2023  2:30 PM  Date of Service: 2023    Chief Complaint: Elevated Blood Pressure (Pt reports swelling in feet and hands, head ache, blurry vision, right upper quadrant pain.  Positive fetal movement, denies bleeding or loss of fluid)        Subjective     Jessica Chin is a 28 y.o. female  at 27w2d with Estimated Date of Delivery: 23 who presents with the chief complaint listed above.  She sees Jabari Santa MD for her prenatal care. Her pregnancy has been complicated by:   uncomplicated .    Patient tearful today thinking she is 'getting pre-eclampsia.'  She reports mid swelling of feet and hands over past few weeks.  Of note, she does walk over 20K steps a day as a .  She also has had a persistent headache that tylenol has not helped.  She denies a history of hypertension and has not had any abnormal blood pressures this pregnancy.  Her blood pressure is normal today.      She describes fetal movement as normal.  She denies rupture of membranes.  She denies vaginal bleeding. She is not feeling contractions.          Objective   There are no problems to display for this patient.       OB History    Para Term  AB Living   1 0 0 0 0 0   SAB IAB Ectopic Molar Multiple Live Births   0 0 0 0 0 0      # Outcome Date GA Lbr [place]/2nd Weight Sex Delivery Anes PTL Lv   1 Current                 Past Medical History:   Diagnosis Date    Abnormal Pap smear of cervix        Past Surgical History:   Procedure Laterality Date    TONSILLECTOMY      WISDOM TOOTH EXTRACTION         No current facility-administered medications on file prior to encounter.     Current Outpatient Medications on File Prior to Encounter   Medication Sig Dispense Refill    Prenatal Vit-Fe Fumarate-FA (prenatal vitamin 27-0.8) 27-0.8 MG tablet tablet Take  by mouth Daily.         Allergies    Allergen Reactions    Azithromycin Anaphylaxis and Swelling     Had to be hospitalized with tongue and throat swelling      Latex Hives       Family History   Problem Relation Age of Onset    Polycystic ovary syndrome Sister     Diabetes Paternal Grandmother     Diabetes Paternal Grandfather        Social History     Socioeconomic History    Marital status: Single   Tobacco Use    Smoking status: Never    Smokeless tobacco: Never   Vaping Use    Vaping Use: Some days   Substance and Sexual Activity    Alcohol use: No    Drug use: Not Currently     Types: Marijuana    Sexual activity: Yes     Partners: Male     Birth control/protection: Pill           Review of Systems   Constitutional:  Negative for chills, fatigue and fever.   HENT:  Negative for congestion, rhinorrhea and sore throat.    Eyes:  Negative for visual disturbance.   Respiratory: Negative.     Cardiovascular:  Positive for leg swelling.   Gastrointestinal:  Negative for abdominal pain, constipation, diarrhea, nausea and vomiting.   Genitourinary:  Negative for difficulty urinating, dyspareunia, dysuria, flank pain, frequency, genital sores, hematuria, pelvic pain, urgency, vaginal bleeding, vaginal discharge and vaginal pain.   Neurological:  Positive for headaches. Negative for dizziness, seizures and light-headedness.   Psychiatric/Behavioral:  Negative for sleep disturbance. The patient is not nervous/anxious.         PHYSICAL EXAM:      VITAL SIGNS:  There were no vitals filed for this visit.     FHT'S:                   Baseline:  145 BPM  Variability:  Moderate = 6 - 25 BPM  Accelerations:  15 x 15 accelerations present     Decelerations:  absent  Contractions:   absent     Interpretation:    Reactive NST, CAT 1 tracing        PHYSICAL EXAM:    General: well developed; well nourished  no acute distress   Heart: Not performed.   Lungs  : breathing is unlabored     Abdomen: soft, non-tender; no masses  no umbilical or inguinal hernias are  present  no hepato-splenomegaly       Cervix: was not checked.      Contractions: none        Extremities: peripheral pulses normal,  mild pedal edema 1+ non-piiting      LABS AND TESTING ORDERED:  Uterine and fetal monitoring  Urinalysis  CBC, CMP    LAB RESULTS:    No results found for this or any previous visit (from the past 24 hour(s)).    Lab Results   Component Value Date    ABO O 2023    RH Positive 2023       No results found for: STREPGPB              Assessment & Plan     ASSESSMENT/PLAN:  Jessica Chin is a 28 y.o. female  at 27w2d who presented with: swelling of hands and feet and headache.  Patient has very mild gestational edema.  She has not had elevated blood pressure and it is normal today.  She does not have proteinuria and a CBC and CMP are normal.  She was reassured her clinical picture does not fit pre-eclampsia and is more consistent with normal complaints of pregnancy (headache, swelling, etc).  She was given fioricet for headache and had improvement.  She was encouraged to hydrate and ask her physician for a work restriction note.  She was discharged home with return precautions given.          Final Impression:  Pregnancy at 27w2d  Reactive NST.  CAT 1 tracing  Maternal vital signs were reviewed and were unremarkable            There were no vitals filed for this visit.    No results found for: STREPGPB  Lab Results   Component Value Date    ABO O 2023    RH Positive 2023     COVID - 19 status unknown      PLAN:       I have spent 45 minutes including face to face time with the patient, greater than 50% in discussion of the diagnosis (counseling) and/or coordination of care.     Sandra Singh MD  2023  15:16 EDT  OB Hospitalist  Phone:  [phone]

## 2023-09-27 LAB — BACTERIA SPEC AEROBE CULT: NO GROWTH

## 2023-10-11 ENCOUNTER — ROUTINE PRENATAL (OUTPATIENT)
Dept: OBSTETRICS AND GYNECOLOGY | Facility: CLINIC | Age: 28
End: 2023-10-11
Payer: OTHER GOVERNMENT

## 2023-10-11 VITALS — BODY MASS INDEX: 28.29 KG/M2 | SYSTOLIC BLOOD PRESSURE: 121 MMHG | WEIGHT: 170 LBS | DIASTOLIC BLOOD PRESSURE: 74 MMHG

## 2023-10-11 DIAGNOSIS — Z34.03 ENCOUNTER FOR SUPERVISION OF NORMAL FIRST PREGNANCY IN THIRD TRIMESTER: Primary | ICD-10-CM

## 2023-10-11 LAB
GLUCOSE UR STRIP-MCNC: NEGATIVE MG/DL
PROT UR STRIP-MCNC: NEGATIVE MG/DL

## 2023-10-11 NOTE — PROGRESS NOTES
CC follow-up OB visit.  Good fetal movement.  Occasional headaches and leg swelling.  No evidence of PIH today.  We will check a CBC and CMP today.  Planning 1 hour glucose at next visit.  Assessment.  29 weeks 4 days gestation with occasional headaches  Plan.  Return to office 2 weeks with 1 hour glucose.  CBC and CMP today.

## 2023-10-12 LAB
ALBUMIN SERPL-MCNC: 3.9 G/DL (ref 3.5–5.2)
ALBUMIN/GLOB SERPL: 2.1 G/DL
ALP SERPL-CCNC: 94 U/L (ref 39–117)
ALT SERPL-CCNC: 24 U/L (ref 1–33)
AST SERPL-CCNC: 25 U/L (ref 1–32)
BASOPHILS # BLD AUTO: 0.08 10*3/MM3 (ref 0–0.2)
BASOPHILS NFR BLD AUTO: 0.6 % (ref 0–1.5)
BILIRUB SERPL-MCNC: 0.3 MG/DL (ref 0–1.2)
BUN SERPL-MCNC: 8 MG/DL (ref 6–20)
BUN/CREAT SERPL: 17.4 (ref 7–25)
CALCIUM SERPL-MCNC: 9.1 MG/DL (ref 8.6–10.5)
CHLORIDE SERPL-SCNC: 105 MMOL/L (ref 98–107)
CO2 SERPL-SCNC: 23.7 MMOL/L (ref 22–29)
CREAT SERPL-MCNC: 0.46 MG/DL (ref 0.57–1)
EGFRCR SERPLBLD CKD-EPI 2021: 135.5 ML/MIN/1.73
EOSINOPHIL # BLD AUTO: 0.18 10*3/MM3 (ref 0–0.4)
EOSINOPHIL NFR BLD AUTO: 1.3 % (ref 0.3–6.2)
ERYTHROCYTE [DISTWIDTH] IN BLOOD BY AUTOMATED COUNT: 12 % (ref 12.3–15.4)
GLOBULIN SER CALC-MCNC: 1.9 GM/DL
GLUCOSE SERPL-MCNC: 81 MG/DL (ref 65–99)
HCT VFR BLD AUTO: 37.3 % (ref 34–46.6)
HGB BLD-MCNC: 12.6 G/DL (ref 12–15.9)
IMM GRANULOCYTES # BLD AUTO: 0.38 10*3/MM3 (ref 0–0.05)
IMM GRANULOCYTES NFR BLD AUTO: 2.8 % (ref 0–0.5)
LYMPHOCYTES # BLD AUTO: 1.88 10*3/MM3 (ref 0.7–3.1)
LYMPHOCYTES NFR BLD AUTO: 14 % (ref 19.6–45.3)
MCH RBC QN AUTO: 31.1 PG (ref 26.6–33)
MCHC RBC AUTO-ENTMCNC: 33.8 G/DL (ref 31.5–35.7)
MCV RBC AUTO: 92.1 FL (ref 79–97)
MONOCYTES # BLD AUTO: 0.87 10*3/MM3 (ref 0.1–0.9)
MONOCYTES NFR BLD AUTO: 6.5 % (ref 5–12)
NEUTROPHILS # BLD AUTO: 10.04 10*3/MM3 (ref 1.7–7)
NEUTROPHILS NFR BLD AUTO: 74.8 % (ref 42.7–76)
NRBC BLD AUTO-RTO: 0 /100 WBC (ref 0–0.2)
PLATELET # BLD AUTO: 258 10*3/MM3 (ref 140–450)
POTASSIUM SERPL-SCNC: 4 MMOL/L (ref 3.5–5.2)
PROT SERPL-MCNC: 5.8 G/DL (ref 6–8.5)
RBC # BLD AUTO: 4.05 10*6/MM3 (ref 3.77–5.28)
SODIUM SERPL-SCNC: 137 MMOL/L (ref 136–145)
WBC # BLD AUTO: 13.43 10*3/MM3 (ref 3.4–10.8)

## 2023-10-25 ENCOUNTER — LAB (OUTPATIENT)
Dept: OBSTETRICS AND GYNECOLOGY | Facility: CLINIC | Age: 28
End: 2023-10-25
Payer: OTHER GOVERNMENT

## 2023-10-25 VITALS — WEIGHT: 174 LBS | SYSTOLIC BLOOD PRESSURE: 124 MMHG | DIASTOLIC BLOOD PRESSURE: 68 MMHG | BODY MASS INDEX: 28.96 KG/M2

## 2023-10-25 DIAGNOSIS — Z34.02 ENCOUNTER FOR SUPERVISION OF NORMAL FIRST PREGNANCY IN SECOND TRIMESTER: ICD-10-CM

## 2023-10-25 DIAGNOSIS — Z34.03 ENCOUNTER FOR SUPERVISION OF NORMAL FIRST PREGNANCY IN THIRD TRIMESTER: ICD-10-CM

## 2023-10-25 LAB
BASOPHILS # BLD AUTO: 0.06 10*3/MM3 (ref 0–0.2)
BASOPHILS NFR BLD AUTO: 0.5 % (ref 0–1.5)
EOSINOPHIL # BLD AUTO: 0.19 10*3/MM3 (ref 0–0.4)
EOSINOPHIL NFR BLD AUTO: 1.5 % (ref 0.3–6.2)
ERYTHROCYTE [DISTWIDTH] IN BLOOD BY AUTOMATED COUNT: 12.1 % (ref 12.3–15.4)
GLUCOSE 1H P 50 G GLC PO SERPL-MCNC: 89 MG/DL (ref 65–139)
GLUCOSE UR STRIP-MCNC: NEGATIVE MG/DL
HCT VFR BLD AUTO: 37.3 % (ref 34–46.6)
HGB BLD-MCNC: 12.6 G/DL (ref 12–15.9)
IMM GRANULOCYTES # BLD AUTO: 0.48 10*3/MM3 (ref 0–0.05)
IMM GRANULOCYTES NFR BLD AUTO: 3.8 % (ref 0–0.5)
LYMPHOCYTES # BLD AUTO: 1.76 10*3/MM3 (ref 0.7–3.1)
LYMPHOCYTES NFR BLD AUTO: 14.1 % (ref 19.6–45.3)
MCH RBC QN AUTO: 30.8 PG (ref 26.6–33)
MCHC RBC AUTO-ENTMCNC: 33.8 G/DL (ref 31.5–35.7)
MCV RBC AUTO: 91.2 FL (ref 79–97)
MONOCYTES # BLD AUTO: 1.02 10*3/MM3 (ref 0.1–0.9)
MONOCYTES NFR BLD AUTO: 8.2 % (ref 5–12)
NEUTROPHILS # BLD AUTO: 8.96 10*3/MM3 (ref 1.7–7)
NEUTROPHILS NFR BLD AUTO: 71.9 % (ref 42.7–76)
NRBC BLD AUTO-RTO: 0 /100 WBC (ref 0–0.2)
PLATELET # BLD AUTO: 259 10*3/MM3 (ref 140–450)
PROT UR STRIP-MCNC: NEGATIVE MG/DL
RBC # BLD AUTO: 4.09 10*6/MM3 (ref 3.77–5.28)
WBC # BLD AUTO: 12.47 10*3/MM3 (ref 3.4–10.8)

## 2023-10-25 PROCEDURE — 0502F SUBSEQUENT PRENATAL CARE: CPT | Performed by: OBSTETRICS & GYNECOLOGY

## 2023-10-25 NOTE — PROGRESS NOTES
CC follow-up OB visit.  O+ blood type.  Good fetal movement and growth.  Discussed various vaccines including Tdap and RSV.  Patient will decide.  1 hour glucose today.  Assessment.  31 weeks 4 days gestation with good movement and growth  Plan.  Return office 2 weeks.

## 2023-10-26 LAB — BLD GP AB SCN SERPL QL: NEGATIVE

## 2023-11-09 ENCOUNTER — ROUTINE PRENATAL (OUTPATIENT)
Dept: OBSTETRICS AND GYNECOLOGY | Facility: CLINIC | Age: 28
End: 2023-11-09
Payer: OTHER GOVERNMENT

## 2023-11-09 VITALS — BODY MASS INDEX: 29.79 KG/M2 | DIASTOLIC BLOOD PRESSURE: 75 MMHG | WEIGHT: 179 LBS | SYSTOLIC BLOOD PRESSURE: 129 MMHG

## 2023-11-09 DIAGNOSIS — Z34.03 ENCOUNTER FOR SUPERVISION OF NORMAL FIRST PREGNANCY IN THIRD TRIMESTER: Primary | ICD-10-CM

## 2023-11-09 LAB
GLUCOSE UR STRIP-MCNC: NEGATIVE MG/DL
PROT UR STRIP-MCNC: NEGATIVE MG/DL

## 2023-11-09 NOTE — PROGRESS NOTES
CC follow-up OB visit .  O+ blood type.  Normal 1 hour glucose.  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  Good fetal movement and growth.  Assessment.  33 weeks 5 days gestation with good growth and movement  Plan.  Return to office 2 weeks.

## 2023-11-20 ENCOUNTER — ROUTINE PRENATAL (OUTPATIENT)
Dept: OBSTETRICS AND GYNECOLOGY | Facility: CLINIC | Age: 28
End: 2023-11-20
Payer: OTHER GOVERNMENT

## 2023-11-20 VITALS — WEIGHT: 184 LBS | BODY MASS INDEX: 30.62 KG/M2 | SYSTOLIC BLOOD PRESSURE: 134 MMHG | DIASTOLIC BLOOD PRESSURE: 80 MMHG

## 2023-11-20 DIAGNOSIS — Z34.03 ENCOUNTER FOR SUPERVISION OF NORMAL FIRST PREGNANCY IN THIRD TRIMESTER: Primary | ICD-10-CM

## 2023-11-20 LAB
GLUCOSE UR STRIP-MCNC: NEGATIVE MG/DL
PROT UR STRIP-MCNC: NEGATIVE MG/DL

## 2023-11-20 NOTE — PROGRESS NOTES
CC follow-up OB visit.  Good fetal movement.  Cervix soft but closed.  Head appears low in the pelvis.  We will check an ultrasound next week for growth and presentation.  Assessment.  35 weeks 2 days gestation with good movement.  Plan.  Return to office 1 week with ultrasound for growth

## 2023-11-29 ENCOUNTER — ROUTINE PRENATAL (OUTPATIENT)
Dept: OBSTETRICS AND GYNECOLOGY | Facility: CLINIC | Age: 28
End: 2023-11-29
Payer: OTHER GOVERNMENT

## 2023-11-29 VITALS — SYSTOLIC BLOOD PRESSURE: 135 MMHG | DIASTOLIC BLOOD PRESSURE: 81 MMHG | BODY MASS INDEX: 31.62 KG/M2 | WEIGHT: 190 LBS

## 2023-11-29 DIAGNOSIS — Z34.03 ENCOUNTER FOR SUPERVISION OF NORMAL FIRST PREGNANCY IN THIRD TRIMESTER: Primary | ICD-10-CM

## 2023-11-29 LAB
GLUCOSE UR STRIP-MCNC: NEGATIVE MG/DL
PROT UR STRIP-MCNC: NEGATIVE MG/DL

## 2023-11-29 PROCEDURE — 0502F SUBSEQUENT PRENATAL CARE: CPT | Performed by: OBSTETRICS & GYNECOLOGY

## 2023-11-29 NOTE — PROGRESS NOTES
CC follow-up OB visit.  Ultrasound today 6 pounds 3 ounces.  36 percentile with normal AC 56 percentile.  CODY 15 cm.  Fetal heart rate 154.  Very normal fetal movement and growth.  Low fetal head.  GBS explained and performed.  Not wanting any vaccines.  Assessment.  36 weeks 4 days gestation with good growth and movement  Plan.  Return to office 1 week.  GBS pending.

## 2023-12-01 LAB — GP B STREP DNA SPEC QL NAA+PROBE: POSITIVE

## 2023-12-05 LAB
CLINDAMYCIN ISLT KB: ABNORMAL
GP B STREP DNA SPEC QL NAA+PROBE: POSITIVE
ORGANISM ID: ABNORMAL

## 2023-12-06 ENCOUNTER — ROUTINE PRENATAL (OUTPATIENT)
Dept: OBSTETRICS AND GYNECOLOGY | Facility: CLINIC | Age: 28
End: 2023-12-06
Payer: OTHER GOVERNMENT

## 2023-12-06 VITALS — WEIGHT: 187 LBS | DIASTOLIC BLOOD PRESSURE: 79 MMHG | BODY MASS INDEX: 31.12 KG/M2 | SYSTOLIC BLOOD PRESSURE: 134 MMHG

## 2023-12-06 DIAGNOSIS — Z34.03 ENCOUNTER FOR SUPERVISION OF NORMAL FIRST PREGNANCY IN THIRD TRIMESTER: Primary | ICD-10-CM

## 2023-12-06 LAB
GLUCOSE UR STRIP-MCNC: NEGATIVE MG/DL
PROT UR STRIP-MCNC: NEGATIVE MG/DL

## 2023-12-06 NOTE — PROGRESS NOTES
CC follow-up OB visit.  GBS positive.  Good fetal movement and growth.  Cervix now 60%/0.5/-1.  No current problems.  Assessment.  37 weeks 4 days gestation with good movement and growth  Plan.  Return to office 1 week.

## 2023-12-14 ENCOUNTER — ANESTHESIA EVENT (OUTPATIENT)
Dept: LABOR AND DELIVERY | Facility: HOSPITAL | Age: 28
End: 2023-12-14
Payer: OTHER GOVERNMENT

## 2023-12-14 ENCOUNTER — ROUTINE PRENATAL (OUTPATIENT)
Dept: OBSTETRICS AND GYNECOLOGY | Facility: CLINIC | Age: 28
End: 2023-12-14
Payer: OTHER GOVERNMENT

## 2023-12-14 ENCOUNTER — ANESTHESIA (OUTPATIENT)
Dept: LABOR AND DELIVERY | Facility: HOSPITAL | Age: 28
End: 2023-12-14
Payer: OTHER GOVERNMENT

## 2023-12-14 ENCOUNTER — HOSPITAL ENCOUNTER (INPATIENT)
Facility: HOSPITAL | Age: 28
LOS: 3 days | Discharge: HOME | End: 2023-12-17
Attending: OBSTETRICS & GYNECOLOGY | Admitting: OBSTETRICS & GYNECOLOGY
Payer: OTHER GOVERNMENT

## 2023-12-14 VITALS — SYSTOLIC BLOOD PRESSURE: 139 MMHG | WEIGHT: 191 LBS | BODY MASS INDEX: 31.78 KG/M2 | DIASTOLIC BLOOD PRESSURE: 83 MMHG

## 2023-12-14 DIAGNOSIS — R80.8 OTHER PROTEINURIA: ICD-10-CM

## 2023-12-14 DIAGNOSIS — Z34.03 ENCOUNTER FOR SUPERVISION OF NORMAL FIRST PREGNANCY IN THIRD TRIMESTER: Primary | ICD-10-CM

## 2023-12-14 DIAGNOSIS — R52 POSTPARTUM PAIN: ICD-10-CM

## 2023-12-14 PROBLEM — Z34.90 PREGNANCY: Status: ACTIVE | Noted: 2023-12-14

## 2023-12-14 PROBLEM — O14.03 MILD PRE-ECLAMPSIA IN THIRD TRIMESTER: Status: ACTIVE | Noted: 2023-12-14

## 2023-12-14 PROBLEM — Z34.90 PREGNANCY: Status: RESOLVED | Noted: 2023-12-14 | Resolved: 2023-12-14

## 2023-12-14 LAB
ABO GROUP BLD: NORMAL
ALBUMIN SERPL-MCNC: 3.7 G/DL (ref 3.5–5.2)
ALBUMIN/GLOB SERPL: 1.7 G/DL
ALP SERPL-CCNC: 173 U/L (ref 39–117)
ALT SERPL W P-5'-P-CCNC: 20 U/L (ref 1–33)
ANION GAP SERPL CALCULATED.3IONS-SCNC: 11 MMOL/L (ref 5–15)
AST SERPL-CCNC: 20 U/L (ref 1–32)
BACTERIA UR QL AUTO: ABNORMAL /HPF
BILIRUB SERPL-MCNC: 0.2 MG/DL (ref 0–1.2)
BILIRUB UR QL STRIP: NEGATIVE
BLD GP AB SCN SERPL QL: NEGATIVE
BUN SERPL-MCNC: 11 MG/DL (ref 6–20)
BUN/CREAT SERPL: 14.9 (ref 7–25)
CALCIUM SPEC-SCNC: 9.1 MG/DL (ref 8.6–10.5)
CHLORIDE SERPL-SCNC: 104 MMOL/L (ref 98–107)
CLARITY UR: CLEAR
CO2 SERPL-SCNC: 21 MMOL/L (ref 22–29)
COLOR UR: YELLOW
CREAT SERPL-MCNC: 0.74 MG/DL (ref 0.57–1)
CREAT UR-MCNC: 66.5 MG/DL
DEPRECATED RDW RBC AUTO: 39 FL (ref 37–54)
EGFRCR SERPLBLD CKD-EPI 2021: 114.6 ML/MIN/1.73
ERYTHROCYTE [DISTWIDTH] IN BLOOD BY AUTOMATED COUNT: 12 % (ref 12.3–15.4)
GLOBULIN UR ELPH-MCNC: 2.2 GM/DL
GLUCOSE SERPL-MCNC: 105 MG/DL (ref 65–99)
GLUCOSE UR STRIP-MCNC: NEGATIVE MG/DL
GLUCOSE UR STRIP-MCNC: NEGATIVE MG/DL
HCT VFR BLD AUTO: 40.7 % (ref 34–46.6)
HGB BLD-MCNC: 13.4 G/DL (ref 12–15.9)
HGB UR QL STRIP.AUTO: ABNORMAL
HYALINE CASTS UR QL AUTO: ABNORMAL /LPF
KETONES UR QL STRIP: NEGATIVE
LEUKOCYTE ESTERASE UR QL STRIP.AUTO: NEGATIVE
MCH RBC QN AUTO: 29.6 PG (ref 26.6–33)
MCHC RBC AUTO-ENTMCNC: 32.9 G/DL (ref 31.5–35.7)
MCV RBC AUTO: 90 FL (ref 79–97)
NITRITE UR QL STRIP: NEGATIVE
PH UR STRIP.AUTO: 6.5 [PH] (ref 5–8)
PLATELET # BLD AUTO: 255 10*3/MM3 (ref 140–450)
PMV BLD AUTO: 10.9 FL (ref 6–12)
POTASSIUM SERPL-SCNC: 4.1 MMOL/L (ref 3.5–5.2)
PROT ?TM UR-MCNC: 38.4 MG/DL
PROT SERPL-MCNC: 5.9 G/DL (ref 6–8.5)
PROT UR QL STRIP: ABNORMAL
PROT UR STRIP-MCNC: ABNORMAL MG/DL
PROT/CREAT UR: 577.4 MG/G CREA (ref 0–200)
RBC # BLD AUTO: 4.52 10*6/MM3 (ref 3.77–5.28)
RBC # UR STRIP: ABNORMAL /HPF
REF LAB TEST METHOD: ABNORMAL
RH BLD: POSITIVE
SODIUM SERPL-SCNC: 136 MMOL/L (ref 136–145)
SP GR UR STRIP: 1.01 (ref 1–1.03)
SQUAMOUS #/AREA URNS HPF: ABNORMAL /HPF
T&S EXPIRATION DATE: NORMAL
UROBILINOGEN UR QL STRIP: ABNORMAL
WBC # UR STRIP: ABNORMAL /HPF
WBC NRBC COR # BLD AUTO: 10.97 10*3/MM3 (ref 3.4–10.8)

## 2023-12-14 PROCEDURE — 86850 RBC ANTIBODY SCREEN: CPT | Performed by: OBSTETRICS & GYNECOLOGY

## 2023-12-14 PROCEDURE — 25010000002 CEFAZOLIN IN DEXTROSE 2-4 GM/100ML-% SOLUTION: Performed by: STUDENT IN AN ORGANIZED HEALTH CARE EDUCATION/TRAINING PROGRAM

## 2023-12-14 PROCEDURE — 84156 ASSAY OF PROTEIN URINE: CPT | Performed by: OBSTETRICS & GYNECOLOGY

## 2023-12-14 PROCEDURE — 0502F SUBSEQUENT PRENATAL CARE: CPT | Performed by: OBSTETRICS & GYNECOLOGY

## 2023-12-14 PROCEDURE — 82570 ASSAY OF URINE CREATININE: CPT | Performed by: OBSTETRICS & GYNECOLOGY

## 2023-12-14 PROCEDURE — 59025 FETAL NON-STRESS TEST: CPT

## 2023-12-14 PROCEDURE — 25010000002 MORPHINE PER 10 MG: Performed by: STUDENT IN AN ORGANIZED HEALTH CARE EDUCATION/TRAINING PROGRAM

## 2023-12-14 PROCEDURE — 4A1HXCZ MONITORING OF PRODUCTS OF CONCEPTION, CARDIAC RATE, EXTERNAL APPROACH: ICD-10-PCS | Performed by: STUDENT IN AN ORGANIZED HEALTH CARE EDUCATION/TRAINING PROGRAM

## 2023-12-14 PROCEDURE — 85027 COMPLETE CBC AUTOMATED: CPT | Performed by: OBSTETRICS & GYNECOLOGY

## 2023-12-14 PROCEDURE — 25810000003 LACTATED RINGERS PER 1000 ML: Performed by: STUDENT IN AN ORGANIZED HEALTH CARE EDUCATION/TRAINING PROGRAM

## 2023-12-14 PROCEDURE — 87086 URINE CULTURE/COLONY COUNT: CPT | Performed by: OBSTETRICS & GYNECOLOGY

## 2023-12-14 PROCEDURE — 80053 COMPREHEN METABOLIC PANEL: CPT | Performed by: OBSTETRICS & GYNECOLOGY

## 2023-12-14 PROCEDURE — 86900 BLOOD TYPING SEROLOGIC ABO: CPT | Performed by: OBSTETRICS & GYNECOLOGY

## 2023-12-14 PROCEDURE — 86901 BLOOD TYPING SEROLOGIC RH(D): CPT | Performed by: OBSTETRICS & GYNECOLOGY

## 2023-12-14 PROCEDURE — 25010000002 TERBUTALINE PER 1 MG: Performed by: STUDENT IN AN ORGANIZED HEALTH CARE EDUCATION/TRAINING PROGRAM

## 2023-12-14 PROCEDURE — 81001 URINALYSIS AUTO W/SCOPE: CPT | Performed by: OBSTETRICS & GYNECOLOGY

## 2023-12-14 RX ORDER — MISOPROSTOL 100 MCG
25 TABLET ORAL ONCE
Status: COMPLETED | OUTPATIENT
Start: 2023-12-14 | End: 2023-12-14

## 2023-12-14 RX ORDER — SODIUM CHLORIDE, SODIUM LACTATE, POTASSIUM CHLORIDE, CALCIUM CHLORIDE 600; 310; 30; 20 MG/100ML; MG/100ML; MG/100ML; MG/100ML
125 INJECTION, SOLUTION INTRAVENOUS CONTINUOUS
Status: DISCONTINUED | OUTPATIENT
Start: 2023-12-14 | End: 2023-12-15

## 2023-12-14 RX ORDER — FAMOTIDINE 10 MG/ML
20 INJECTION, SOLUTION INTRAVENOUS 2 TIMES DAILY PRN
Status: DISCONTINUED | OUTPATIENT
Start: 2023-12-14 | End: 2023-12-15

## 2023-12-14 RX ORDER — ACETAMINOPHEN 325 MG/1
650 TABLET ORAL EVERY 4 HOURS PRN
Status: DISCONTINUED | OUTPATIENT
Start: 2023-12-14 | End: 2023-12-15

## 2023-12-14 RX ORDER — TERBUTALINE SULFATE 1 MG/ML
0.25 INJECTION, SOLUTION SUBCUTANEOUS AS NEEDED
Status: DISCONTINUED | OUTPATIENT
Start: 2023-12-14 | End: 2023-12-15

## 2023-12-14 RX ORDER — SODIUM CHLORIDE, SODIUM LACTATE, POTASSIUM CHLORIDE, CALCIUM CHLORIDE 600; 310; 30; 20 MG/100ML; MG/100ML; MG/100ML; MG/100ML
125 INJECTION, SOLUTION INTRAVENOUS CONTINUOUS
Status: DISCONTINUED | OUTPATIENT
Start: 2023-12-14 | End: 2023-12-14

## 2023-12-14 RX ORDER — ONDANSETRON 4 MG/1
4 TABLET, FILM COATED ORAL EVERY 6 HOURS PRN
Status: DISCONTINUED | OUTPATIENT
Start: 2023-12-14 | End: 2023-12-15

## 2023-12-14 RX ORDER — CEFAZOLIN SODIUM 2 G/100ML
2000 INJECTION, SOLUTION INTRAVENOUS ONCE
Status: COMPLETED | OUTPATIENT
Start: 2023-12-14 | End: 2023-12-14

## 2023-12-14 RX ORDER — ONDANSETRON 2 MG/ML
4 INJECTION INTRAMUSCULAR; INTRAVENOUS ONCE AS NEEDED
Status: DISCONTINUED | OUTPATIENT
Start: 2023-12-14 | End: 2023-12-15

## 2023-12-14 RX ORDER — OXYTOCIN/0.9 % SODIUM CHLORIDE 30/500 ML
2-20 PLASTIC BAG, INJECTION (ML) INTRAVENOUS
Status: DISCONTINUED | OUTPATIENT
Start: 2023-12-14 | End: 2023-12-15

## 2023-12-14 RX ORDER — FENTANYL CIT 0.2 MG/100ML-ROPIV 0.2%-NACL 0.9% EPIDURAL INJ 2/0.2 MCG/ML-%
10 SOLUTION INJECTION CONTINUOUS
Status: DISCONTINUED | OUTPATIENT
Start: 2023-12-14 | End: 2023-12-15

## 2023-12-14 RX ORDER — SODIUM CHLORIDE 9 MG/ML
40 INJECTION, SOLUTION INTRAVENOUS AS NEEDED
Status: DISCONTINUED | OUTPATIENT
Start: 2023-12-14 | End: 2023-12-15

## 2023-12-14 RX ORDER — SODIUM CHLORIDE 0.9 % (FLUSH) 0.9 %
10 SYRINGE (ML) INJECTION EVERY 12 HOURS SCHEDULED
Status: DISCONTINUED | OUTPATIENT
Start: 2023-12-14 | End: 2023-12-15

## 2023-12-14 RX ORDER — DIPHENHYDRAMINE HYDROCHLORIDE 50 MG/ML
12.5 INJECTION INTRAMUSCULAR; INTRAVENOUS EVERY 8 HOURS PRN
Status: DISCONTINUED | OUTPATIENT
Start: 2023-12-14 | End: 2023-12-15

## 2023-12-14 RX ORDER — FAMOTIDINE 10 MG/ML
20 INJECTION, SOLUTION INTRAVENOUS ONCE AS NEEDED
Status: DISCONTINUED | OUTPATIENT
Start: 2023-12-14 | End: 2023-12-15

## 2023-12-14 RX ORDER — SODIUM CHLORIDE 0.9 % (FLUSH) 0.9 %
10 SYRINGE (ML) INJECTION AS NEEDED
Status: DISCONTINUED | OUTPATIENT
Start: 2023-12-14 | End: 2023-12-15

## 2023-12-14 RX ORDER — HYDROXYZINE 50 MG/1
25 TABLET, FILM COATED ORAL 3 TIMES DAILY PRN
Status: DISCONTINUED | OUTPATIENT
Start: 2023-12-14 | End: 2023-12-15

## 2023-12-14 RX ORDER — LIDOCAINE HYDROCHLORIDE 10 MG/ML
0.5 INJECTION, SOLUTION INFILTRATION; PERINEURAL ONCE AS NEEDED
Status: DISCONTINUED | OUTPATIENT
Start: 2023-12-14 | End: 2023-12-15

## 2023-12-14 RX ORDER — NALOXONE HCL 0.4 MG/ML
0.4 VIAL (ML) INJECTION
Status: DISCONTINUED | OUTPATIENT
Start: 2023-12-14 | End: 2023-12-15

## 2023-12-14 RX ORDER — FAMOTIDINE 20 MG/1
20 TABLET, FILM COATED ORAL 2 TIMES DAILY PRN
Status: DISCONTINUED | OUTPATIENT
Start: 2023-12-14 | End: 2023-12-15

## 2023-12-14 RX ORDER — EPHEDRINE SULFATE 50 MG/ML
5 INJECTION, SOLUTION INTRAVENOUS AS NEEDED
Status: DISCONTINUED | OUTPATIENT
Start: 2023-12-14 | End: 2023-12-15

## 2023-12-14 RX ORDER — MORPHINE SULFATE 2 MG/ML
1 INJECTION, SOLUTION INTRAMUSCULAR; INTRAVENOUS EVERY 4 HOURS PRN
Status: DISCONTINUED | OUTPATIENT
Start: 2023-12-14 | End: 2023-12-15

## 2023-12-14 RX ORDER — CEFAZOLIN SODIUM 1 G/50ML
1000 INJECTION, SOLUTION INTRAVENOUS EVERY 8 HOURS
Status: DISCONTINUED | OUTPATIENT
Start: 2023-12-15 | End: 2023-12-15

## 2023-12-14 RX ORDER — ONDANSETRON 2 MG/ML
4 INJECTION INTRAMUSCULAR; INTRAVENOUS EVERY 6 HOURS PRN
Status: DISCONTINUED | OUTPATIENT
Start: 2023-12-14 | End: 2023-12-15

## 2023-12-14 RX ORDER — MAGNESIUM CARB/ALUMINUM HYDROX 105-160MG
30 TABLET,CHEWABLE ORAL ONCE AS NEEDED
Status: DISCONTINUED | OUTPATIENT
Start: 2023-12-14 | End: 2023-12-15

## 2023-12-14 RX ADMIN — NICOTINE 1 PATCH: 7 PATCH, EXTENDED RELEASE TRANSDERMAL at 18:45

## 2023-12-14 RX ADMIN — MORPHINE SULFATE 1 MG: 2 INJECTION, SOLUTION INTRAMUSCULAR; INTRAVENOUS at 23:12

## 2023-12-14 RX ADMIN — SODIUM CHLORIDE, POTASSIUM CHLORIDE, SODIUM LACTATE AND CALCIUM CHLORIDE 125 ML/HR: 600; 310; 30; 20 INJECTION, SOLUTION INTRAVENOUS at 20:00

## 2023-12-14 RX ADMIN — TERBUTALINE SULFATE 0.25 MG: 1 INJECTION, SOLUTION SUBCUTANEOUS at 23:13

## 2023-12-14 RX ADMIN — NICOTINE 1 PATCH: 7 PATCH, EXTENDED RELEASE TRANSDERMAL at 21:45

## 2023-12-14 RX ADMIN — CEFAZOLIN SODIUM 2000 MG: 2 INJECTION, SOLUTION INTRAVENOUS at 20:00

## 2023-12-14 RX ADMIN — Medication 25 MCG: at 18:10

## 2023-12-14 RX ADMIN — HYDROXYZINE HYDROCHLORIDE 25 MG: 50 TABLET, FILM COATED ORAL at 23:17

## 2023-12-14 NOTE — PLAN OF CARE
Problem: Adult Inpatient Plan of Care  Goal: Plan of Care Review  Outcome: Ongoing, Progressing  Flowsheets (Taken 12/14/2023 1836)  Progress: no change  Outcome Evaluation: Pt beginning IOL for Pre-Eclampsia. Pt denies headache, blurry vision, or RUQ pain. Pt endorses occasional ctx, +FM. Denies VB or LOF. Cytotec placed for 1 time dose then will begin pitocin. Consents signed and in chart. Pt verbalized understanding. VSS  Goal: Patient-Specific Goal (Individualized)  Outcome: Ongoing, Progressing  Goal: Absence of Hospital-Acquired Illness or Injury  Outcome: Ongoing, Progressing  Intervention: Identify and Manage Fall Risk  Recent Flowsheet Documentation  Taken 12/14/2023 1800 by Flip Martinez RN  Safety Promotion/Fall Prevention: safety round/check completed  Goal: Optimal Comfort and Wellbeing  Outcome: Ongoing, Progressing  Intervention: Provide Person-Centered Care  Recent Flowsheet Documentation  Taken 12/14/2023 1800 by Flip Martinez RN  Trust Relationship/Rapport:   care explained   choices provided   emotional support provided   questions answered   empathic listening provided   questions encouraged   reassurance provided   thoughts/feelings acknowledged  Goal: Readiness for Transition of Care  Outcome: Ongoing, Progressing     Problem: Bleeding (Labor)  Goal: Hemostasis  Outcome: Ongoing, Progressing     Problem: Change in Fetal Wellbeing (Labor)  Goal: Stable Fetal Wellbeing  Outcome: Ongoing, Progressing     Problem: Delayed Labor Progression (Labor)  Goal: Effective Progression to Delivery  Outcome: Ongoing, Progressing     Problem: Infection (Labor)  Goal: Absence of Infection Signs and Symptoms  Outcome: Ongoing, Progressing     Problem: Labor Pain (Labor)  Goal: Acceptable Pain Control  Outcome: Ongoing, Progressing     Problem: Uterine Tachysystole (Labor)  Goal: Normal Uterine Contraction Pattern  Outcome: Ongoing, Progressing     Problem: Pain Acute  Goal: Acceptable Pain Control and  Functional Ability  Outcome: Ongoing, Progressing   Goal Outcome Evaluation:           Progress: no change  Outcome Evaluation: Pt beginning IOL for Pre-Eclampsia. Pt denies headache, blurry vision, or RUQ pain. Pt endorses occasional ctx, +FM. Denies VB or LOF. Cytotec placed for 1 time dose then will begin pitocin. Consents signed and in chart. Pt verbalized understanding. VSS

## 2023-12-14 NOTE — NON STRESS TEST
Jessica Chin, a  at 38w5d with an LOUIS of 2023, by Last Menstrual Period, was seen at [facility] for a nonstress test.    Chief Complaint   Patient presents with    Elevated Blood Pressure     Outpatient: Patient sent from the office for elvated BPs. Denies visual change or RUQ pain. Reports a HA but has not taken any medication.       Patient Active Problem List   Diagnosis    Pregnancy       Start Time: 1506  Stop Time: 1630    Interpretation A  Nonstress Test Interpretation A: Reactive

## 2023-12-14 NOTE — PROGRESS NOTES
CC follow-up OB visit.  GBS positive.  Normal 1 hour glucose.  O+ blood type.  Patient complained of mild leaking over the last 3 days.  Nitrazine totally negative today.  1+ proteinuria.  Patient does complain of a persistent headache today.  BPP was 8/8 with adequate growth and normal CODY.  Cervix was changed to 2 cm / 70%/-1.  Assessment.  38 weeks 5 days gestation with mild elevation of blood pressure, 1+ proteinuria, and headache  Plan.  Will send to labor and delivery for CBC CMP and PC ratio.  Will monitor blood pressures and check for cervical change.  Induction if any question of PIH.

## 2023-12-15 PROBLEM — Z34.90 PREGNANCY: Status: RESOLVED | Noted: 2023-12-14 | Resolved: 2023-12-15

## 2023-12-15 PROCEDURE — 88307 TISSUE EXAM BY PATHOLOGIST: CPT

## 2023-12-15 PROCEDURE — 59400 OBSTETRICAL CARE: CPT | Performed by: STUDENT IN AN ORGANIZED HEALTH CARE EDUCATION/TRAINING PROGRAM

## 2023-12-15 PROCEDURE — 0KQM0ZZ REPAIR PERINEUM MUSCLE, OPEN APPROACH: ICD-10-PCS | Performed by: STUDENT IN AN ORGANIZED HEALTH CARE EDUCATION/TRAINING PROGRAM

## 2023-12-15 PROCEDURE — C1755 CATHETER, INTRASPINAL: HCPCS | Performed by: STUDENT IN AN ORGANIZED HEALTH CARE EDUCATION/TRAINING PROGRAM

## 2023-12-15 PROCEDURE — 10907ZC DRAINAGE OF AMNIOTIC FLUID, THERAPEUTIC FROM PRODUCTS OF CONCEPTION, VIA NATURAL OR ARTIFICIAL OPENING: ICD-10-PCS | Performed by: STUDENT IN AN ORGANIZED HEALTH CARE EDUCATION/TRAINING PROGRAM

## 2023-12-15 PROCEDURE — 3E0P7VZ INTRODUCTION OF HORMONE INTO FEMALE REPRODUCTIVE, VIA NATURAL OR ARTIFICIAL OPENING: ICD-10-PCS | Performed by: STUDENT IN AN ORGANIZED HEALTH CARE EDUCATION/TRAINING PROGRAM

## 2023-12-15 PROCEDURE — 25810000003 LACTATED RINGERS PER 1000 ML: Performed by: STUDENT IN AN ORGANIZED HEALTH CARE EDUCATION/TRAINING PROGRAM

## 2023-12-15 PROCEDURE — 25010000002 CEFAZOLIN 1-4 GM/50ML-% SOLUTION: Performed by: STUDENT IN AN ORGANIZED HEALTH CARE EDUCATION/TRAINING PROGRAM

## 2023-12-15 PROCEDURE — 0UQMXZZ REPAIR VULVA, EXTERNAL APPROACH: ICD-10-PCS | Performed by: STUDENT IN AN ORGANIZED HEALTH CARE EDUCATION/TRAINING PROGRAM

## 2023-12-15 PROCEDURE — 25010000002 ROPIVACAINE PER 1 MG: Performed by: STUDENT IN AN ORGANIZED HEALTH CARE EDUCATION/TRAINING PROGRAM

## 2023-12-15 RX ORDER — HYDROXYZINE 50 MG/1
50 TABLET, FILM COATED ORAL NIGHTLY PRN
Status: DISCONTINUED | OUTPATIENT
Start: 2023-12-15 | End: 2023-12-17 | Stop reason: HOSPADM

## 2023-12-15 RX ORDER — TRANEXAMIC ACID 10 MG/ML
1000 INJECTION, SOLUTION INTRAVENOUS ONCE AS NEEDED
Status: DISCONTINUED | OUTPATIENT
Start: 2023-12-15 | End: 2023-12-17 | Stop reason: HOSPADM

## 2023-12-15 RX ORDER — OXYTOCIN/0.9 % SODIUM CHLORIDE 30/500 ML
125 PLASTIC BAG, INJECTION (ML) INTRAVENOUS CONTINUOUS PRN
Status: COMPLETED | OUTPATIENT
Start: 2023-12-15 | End: 2023-12-15

## 2023-12-15 RX ORDER — METHYLERGONOVINE MALEATE 0.2 MG/ML
200 INJECTION INTRAVENOUS ONCE AS NEEDED
Status: DISCONTINUED | OUTPATIENT
Start: 2023-12-15 | End: 2023-12-15 | Stop reason: HOSPADM

## 2023-12-15 RX ORDER — HYDROCORTISONE 25 MG/G
1 CREAM TOPICAL AS NEEDED
Status: DISCONTINUED | OUTPATIENT
Start: 2023-12-15 | End: 2023-12-17 | Stop reason: HOSPADM

## 2023-12-15 RX ORDER — CARBOPROST TROMETHAMINE 250 UG/ML
250 INJECTION, SOLUTION INTRAMUSCULAR
Status: DISCONTINUED | OUTPATIENT
Start: 2023-12-15 | End: 2023-12-15 | Stop reason: HOSPADM

## 2023-12-15 RX ORDER — MISOPROSTOL 200 UG/1
800 TABLET ORAL ONCE AS NEEDED
Status: DISCONTINUED | OUTPATIENT
Start: 2023-12-15 | End: 2023-12-15 | Stop reason: HOSPADM

## 2023-12-15 RX ORDER — ACETAMINOPHEN 325 MG/1
650 TABLET ORAL EVERY 6 HOURS PRN
Status: DISCONTINUED | OUTPATIENT
Start: 2023-12-15 | End: 2023-12-17 | Stop reason: HOSPADM

## 2023-12-15 RX ORDER — ONDANSETRON 2 MG/ML
4 INJECTION INTRAMUSCULAR; INTRAVENOUS EVERY 6 HOURS PRN
Status: DISCONTINUED | OUTPATIENT
Start: 2023-12-15 | End: 2023-12-17 | Stop reason: HOSPADM

## 2023-12-15 RX ORDER — OXYTOCIN/0.9 % SODIUM CHLORIDE 30/500 ML
999 PLASTIC BAG, INJECTION (ML) INTRAVENOUS ONCE
Status: COMPLETED | OUTPATIENT
Start: 2023-12-15 | End: 2023-12-15

## 2023-12-15 RX ORDER — TRAMADOL HYDROCHLORIDE 50 MG/1
50 TABLET ORAL EVERY 6 HOURS PRN
Status: DISCONTINUED | OUTPATIENT
Start: 2023-12-15 | End: 2023-12-17 | Stop reason: HOSPADM

## 2023-12-15 RX ORDER — ROPIVACAINE HYDROCHLORIDE 2 MG/ML
INJECTION, SOLUTION EPIDURAL; INFILTRATION; PERINEURAL AS NEEDED
Status: DISCONTINUED | OUTPATIENT
Start: 2023-12-15 | End: 2023-12-15 | Stop reason: SURG

## 2023-12-15 RX ORDER — SODIUM CHLORIDE 0.9 % (FLUSH) 0.9 %
1-10 SYRINGE (ML) INJECTION AS NEEDED
Status: DISCONTINUED | OUTPATIENT
Start: 2023-12-15 | End: 2023-12-17 | Stop reason: HOSPADM

## 2023-12-15 RX ORDER — OXYTOCIN/0.9 % SODIUM CHLORIDE 30/500 ML
250 PLASTIC BAG, INJECTION (ML) INTRAVENOUS CONTINUOUS
Status: DISPENSED | OUTPATIENT
Start: 2023-12-15 | End: 2023-12-15

## 2023-12-15 RX ORDER — IBUPROFEN 600 MG/1
600 TABLET ORAL EVERY 6 HOURS PRN
Status: DISCONTINUED | OUTPATIENT
Start: 2023-12-15 | End: 2023-12-17 | Stop reason: HOSPADM

## 2023-12-15 RX ORDER — BISACODYL 10 MG
10 SUPPOSITORY, RECTAL RECTAL DAILY PRN
Status: DISCONTINUED | OUTPATIENT
Start: 2023-12-16 | End: 2023-12-17 | Stop reason: HOSPADM

## 2023-12-15 RX ORDER — DOCUSATE SODIUM 100 MG/1
100 CAPSULE, LIQUID FILLED ORAL 2 TIMES DAILY
Status: DISCONTINUED | OUTPATIENT
Start: 2023-12-15 | End: 2023-12-17 | Stop reason: HOSPADM

## 2023-12-15 RX ORDER — ONDANSETRON 4 MG/1
4 TABLET, FILM COATED ORAL EVERY 8 HOURS PRN
Status: DISCONTINUED | OUTPATIENT
Start: 2023-12-15 | End: 2023-12-17 | Stop reason: HOSPADM

## 2023-12-15 RX ORDER — PRENATAL VIT/IRON FUM/FOLIC AC 27MG-0.8MG
1 TABLET ORAL DAILY
Status: DISCONTINUED | OUTPATIENT
Start: 2023-12-15 | End: 2023-12-17 | Stop reason: HOSPADM

## 2023-12-15 RX ADMIN — SODIUM CHLORIDE, POTASSIUM CHLORIDE, SODIUM LACTATE AND CALCIUM CHLORIDE 125 ML/HR: 600; 310; 30; 20 INJECTION, SOLUTION INTRAVENOUS at 02:49

## 2023-12-15 RX ADMIN — Medication 999 ML/HR: at 06:50

## 2023-12-15 RX ADMIN — IBUPROFEN 600 MG: 600 TABLET, FILM COATED ORAL at 08:33

## 2023-12-15 RX ADMIN — CEFAZOLIN SODIUM 1000 MG: 1 INJECTION, SOLUTION INTRAVENOUS at 02:25

## 2023-12-15 RX ADMIN — NICOTINE 1 PATCH: 7 PATCH, EXTENDED RELEASE TRANSDERMAL at 13:46

## 2023-12-15 RX ADMIN — LIDOCAINE HYDROCHLORIDE 3 ML: 10; .005 INJECTION, SOLUTION EPIDURAL; INFILTRATION; INTRACAUDAL; PERINEURAL at 02:12

## 2023-12-15 RX ADMIN — Medication 125 ML/HR: at 08:15

## 2023-12-15 RX ADMIN — DOCUSATE SODIUM 100 MG: 100 CAPSULE, LIQUID FILLED ORAL at 13:45

## 2023-12-15 RX ADMIN — Medication 1 TABLET: at 13:45

## 2023-12-15 RX ADMIN — TRAMADOL HYDROCHLORIDE 50 MG: 50 TABLET ORAL at 21:22

## 2023-12-15 RX ADMIN — Medication: at 13:40

## 2023-12-15 RX ADMIN — ROPIVACAINE HYDROCHLORIDE 10 ML: 2 INJECTION, SOLUTION EPIDURAL; INFILTRATION at 02:17

## 2023-12-15 RX ADMIN — SODIUM CHLORIDE, POTASSIUM CHLORIDE, SODIUM LACTATE AND CALCIUM CHLORIDE 999 ML/HR: 600; 310; 30; 20 INJECTION, SOLUTION INTRAVENOUS at 01:59

## 2023-12-15 RX ADMIN — TRAMADOL HYDROCHLORIDE 50 MG: 50 TABLET ORAL at 13:51

## 2023-12-15 RX ADMIN — Medication 10 ML/HR: at 02:20

## 2023-12-15 NOTE — PLAN OF CARE
Goal Outcome Evaluation:   Admission checks completed and vital signs are stable. Bonding well with infant. Has requested help with lactation. Patient has EPO against significant other, which is the reason for confidential status. Significant other does not know she is here. Has not been an issue this shift. Sister at bedside. Patient is pleasant and cooperative.

## 2023-12-15 NOTE — PROGRESS NOTES
OB Progress Note    MD to bedside to assess labor progress following dose of cytotec. Evaluation of the fetal heart tracing notes category 1 FHT with tachysystole. Patient remained 2/70/-2 on repeat check per nursing. We discussed performing amniotomy and placement of IUPC to see if she is truly having tachysystole. Patient gave verbal consent. Amniotomy performed at 2300 with moderate amount of clear fluid noted with [place]. Fetal head well applied. Repeat cervical examination noted to be 3/70/-2. IUPC then placed without difficulty. Patient remained tachysystole following amniotomy and IUPC placement with category 1 FHT. Terbutaline 0.25 mg SQ was then given and the tachysystole improved. Will continue to monitor and if contractions start to space out, will then start pitocin for augmentation.     Kimberly Lion MD

## 2023-12-15 NOTE — LACTATION NOTE
P1. Term baby. Pt reports baby is latching well. Educated on importance of deep latching and ways to achieve it. Encouraged to BF at least every 2-3 hours for 10-15 min on each breast. It appears baby does not have lip or tongue tie. Encouraged pt to call LC as needed. Pt has personal pump    Lactation Consult Note    Evaluation Completed: 12/15/2023 11:53 EST  Patient Name: Jessica Chin  :  1995  MRN:  [id number]     REFERRAL  INFORMATION:                                         DELIVERY HISTORY:        Skin to skin initiation date/time: 12/15/2023  6:49 AM   Skin to skin end date/time:           MATERNAL ASSESSMENT:                               INFANT ASSESSMENT:  Information for the patient's :  Jonas Chin [[id number]]   No past medical history on file.                                                                                                   MATERNAL INFANT FEEDING:                                                                       EQUIPMENT TYPE:                                 BREAST PUMPING:          LACTATION REFERRALS:

## 2023-12-15 NOTE — H&P
[facility]  Obstetric History and Physical    Chief Complaint   Patient presents with    Elevated Blood Pressure     Outpatient: Patient sent from the office for elvated BPs. Denies visual change or RUQ pain. Reports a HA but has not taken any medication.       Patient is a 28 y.o. female  currently at 38w5d, who presents from the office with elevated blood pressures for evaluation of preeclampsia. She was observed for several hours and was noted to have mild range blood pressures during that time and an elevated P/C ratio 577.4. She thus ruled in for preeclampsia without severe features. She does endorse a mild headache that has been present for the last 3 weeks for which she has not taken any medication. Denies vision changes, chest pain, shortness of breath, or RUQ pain. She has irregular mild contractions. She had mild leakage of fluid over the last 3 days but had negative Nitrazine in the office. Denies vaginal bleeding. She reports active fetal movement.    Her prenatal care was with Dr. Santa and was complicated by GBS positive status, vapes nicotine containing substances.   She is concerned regarding taking penicillin and having allergic reaction since she has never taken the medication and her sister has that allergy and many others. She has taken a cephalosporin before and tolerated it well.         External Prenatal Results       Pregnancy Outside Results - Transcribed From Office Records - See Scanned Records For Details       Test Value Date Time    ABO  O  23 1521    Rh  Positive  23 1521    Antibody Screen  Negative  23 1521       Negative  10/25/23 1135       Negative  23 1539    Varicella IgG       Rubella  2.48 index 23 1539    Hgb  13.4 g/dL 23 1521       12.6 g/dL 10/25/23 1135       12.6 g/dL 10/11/23 1208       11.5 g/dL 23 1518       12.5 g/dL 23 1539    Hct  40.7 % 23 1521       37.3 % 10/25/23 1135       37.3 % 10/11/23 1208        33.7 % 23 1518       37.6 % 23 1539    Glucose Fasting GTT       Glucose Tolerance Test 1 hour       Glucose Tolerance Test 3 hour       Gonorrhea (discrete)  Negative  23 1521    Chlamydia (discrete)  Negative  23 1521    RPR  Non Reactive  23 1539    VDRL       Syphilis Antibody       HBsAg  Negative  23 1539    Herpes Simplex Virus PCR       Herpes Simplex VIrus Culture       HIV  Non Reactive  23 1539    Hep C RNA Quant PCR       Hep C Antibody  Non Reactive  23 1539    AFP       Group B Strep  Positive  23 1405    GBS Susceptibility to Clindamycin       GBS Susceptibility to Erythromycin       Fetal Fibronectin       Genetic Testing, Maternal Blood                 Drug Screening       Test Value Date Time    Urine Drug Screen       Amphetamine Screen  Negative ng/mL 23 1521    Barbiturate Screen  Negative ng/mL 23 1521    Benzodiazepine Screen  Negative ng/mL 23 1521    Methadone Screen  Negative ng/mL 23 1521    Phencyclidine Screen  Negative ng/mL 23 1521    Opiates Screen       THC Screen       Cocaine Screen       Propoxyphene Screen  Negative ng/mL 23 1521    Buprenorphine Screen       Methamphetamine Screen       Oxycodone Screen       Tricyclic Antidepressants Screen                 Legend    ^: Historical                               OB History    Para Term  AB Living   1 0 0 0 0 0   SAB IAB Ectopic Molar Multiple Live Births   0 0 0 0 0 0      # Outcome Date GA Lbr [place]/2nd Weight Sex Delivery Anes PTL Lv   1 Current                    Past Medical History:   Diagnosis Date    Abnormal Pap smear of cervix           Past Surgical History:   Procedure Laterality Date    TONSILLECTOMY      WISDOM TOOTH EXTRACTION            No current facility-administered medications on file prior to encounter.     Current Outpatient Medications on File Prior to Encounter   Medication Sig Dispense Refill    Prenatal  "Vit-Fe Fumarate-FA (prenatal vitamin 27-0.8) 27-0.8 MG tablet tablet Take  by mouth Daily.            Allergies   Allergen Reactions    Azithromycin Anaphylaxis and Swelling     Had to be hospitalized with tongue and throat swelling      Latex Hives          Social History     Socioeconomic History    Marital status: Single   Tobacco Use    Smoking status: Never    Smokeless tobacco: Never   Vaping Use    Vaping Use: Some days    Substances: Nicotine   Substance and Sexual Activity    Alcohol use: No    Drug use: Not Currently     Types: Marijuana    Sexual activity: Yes     Partners: Male     Birth control/protection: Pill          Family History   Problem Relation Age of Onset    Polycystic ovary syndrome Sister     Diabetes Paternal Grandmother     Diabetes Paternal Grandfather         Review of Systems   Constitutional:  Negative for chills and fever.   Eyes:  Negative for visual disturbance.   Respiratory:  Negative for shortness of breath.    Cardiovascular:  Positive for leg swelling. Negative for chest pain.   Gastrointestinal:  Negative for abdominal pain.   Genitourinary:  Positive for vaginal discharge. Negative for vaginal bleeding.   Neurological:  Positive for headache.       /89   Pulse 86   Temp 98.1 °F (36.7 °C) (Oral)   Resp 16   Ht 165.1 cm (65\")   LMP 03/18/2023   SpO2 98%   Breastfeeding Yes   BMI 31.78 kg/m²     Physical Exam  Vitals reviewed.   Constitutional:       General: She is not in acute distress.  HENT:      Head: Normocephalic and atraumatic.      Right Ear: External ear normal.      Left Ear: External ear normal.   Eyes:      Extraocular Movements: Extraocular movements intact.      Pupils: Pupils are equal, round, and reactive to light.   Cardiovascular:      Rate and Rhythm: Normal rate.   Pulmonary:      Effort: Pulmonary effort is normal. No respiratory distress.   Abdominal:      General: There is no distension.      Palpations: Abdomen is soft.      Tenderness: " There is no abdominal tenderness. There is no guarding or rebound.      Comments: Gravid, 7-7.5#   Musculoskeletal:         General: No deformity. Normal range of motion.      Cervical back: Normal range of motion and neck supple.   Skin:     General: Skin is warm and dry.   Neurological:      General: No focal deficit present.      Mental Status: She is alert and oriented to person, place, and time.   Psychiatric:         Mood and Affect: Mood normal.         Behavior: Behavior normal.           FHT - 130s baseline, moderate variability, accels +, decels -   Webber - Q 3-4 mins    Lab Results   Component Value Date    WBC 10.97 (H) 12/14/2023    HGB 13.4 12/14/2023    HCT 40.7 12/14/2023    MCV 90.0 12/14/2023     12/14/2023        Lab Results   Component Value Date    GLUCOSE 105 (H) 12/14/2023    BUN 11 12/14/2023    CREATININE 0.74 12/14/2023    EGFRRESULT 135.5 10/11/2023    EGFR 114.6 12/14/2023    BCR 14.9 12/14/2023    K 4.1 12/14/2023    CO2 21.0 (L) 12/14/2023    CALCIUM 9.1 12/14/2023    PROTENTOTREF 5.8 (L) 10/11/2023    ALBUMIN 3.7 12/14/2023    BILITOT 0.2 12/14/2023    AST 20 12/14/2023    ALT 20 12/14/2023         Assessment:  1.  Intrauterine pregnancy at 38w5d weeks gestation with reactive fetal status.    2.  Induction of labor for preeclampsia without severe features with favorable cervix   3.  Obstetrical history significant for is remarkable for preeclampsia without severe features, nicotine use, GBS positive status  4.  GBS status: positive     Plan:  Will admit the patient to L&D for induction of labor after the patient's workup for preeclampsia ruled her in for preeclampsia without severe features. I reviewed her blood pressures and preeclampsia labs that demonstrate no evidence of severe features and since she has had a persistent headache for 3 weeks, I do not suspect this is a severe feature and that she needs magnesium sulfate for seizure prophylaxis at this time. Will proceed with  induction of labor with one dose of cytotec followed by pitocin augmentation. Given her concern for penicillin reaction and tolerance of cephalosporin, will start Ancef for GBS prophylaxis. I discussed this plan of care with the patient and her family and she agrees. All questions and concerns answered. Anticipate a vaginal delivery. Discussed that anesthesia is available for epidural if patient desires.           Kimberly Lion MD  12/14/2023  19:21 EST

## 2023-12-15 NOTE — L&D DELIVERY NOTE
[facility]   Vaginal Delivery Note    Patient Name: Jessica Chin  : 1995  MRN: [id number]    Date of Delivery: 12/15/2023     Diagnosis     Pre & Post-Delivery:  Intrauterine pregnancy at 38w6d  GBS positive status   Labor status: Induced Onset of Labor     Mild pre-eclampsia in third trimester     (spontaneous vaginal delivery)             Problem List    Transfer to Postpartum     Review the Delivery Report for details.     Delivery     Delivery: Vaginal, Spontaneous     YOB: 2021    Time of Birth:  Gestational Age 6:48 AM   38w6d     Anesthesia: Epidural     Delivering clinician: Kimberly Lion    Forceps?   No   Vacuum? No    Shoulder dystocia present: No        Delivery narrative:        Procedure: Spontaneous vaginal delivery    Surgeon: Kimberly Lion MD    Preop diagnosis:  28 y.o.  year old  in active labor at 38w6d with pregnancy complicated by preeclampsia without severe features and GBS positive status        Postop diagnosis: Same    Indications: Jessica Chin is a 28 y.o.  at 38w6d who presented yesterday from the office following elevated blood pressures in the office. She was observed for several hours on L&D with ongoing mild range blood pressures and elevated P/C ratio of 577.4, ruling her in for preeclampsia without severe features. She was then counseled on recommendation for induction of labor and she agreed. Her cervix was favorable at 2/70/-2 and she was given one dose of cytotec for cervical ripening. She made no progress following dose on recheck but was tachysystole on tocometry. She underwent amniotomy at 2300 with clear fluid noted and cervix progressed to 3/70/-2 after rupture. An IUPC was then placed to monitor contractions. She remained tachysystole after AROM and Terbutaline 0.25 mg SQ was given. Her contraction pattern then improved and she continued to progress without further augmentation. She received an epidural at 5-6 cm  dilation. She continued to progress to complete dilation at 0529. She then pushed for an 1 hour and 15 minutes.     Findings: Female infant, Apgar 9/9, Weight 3767 g (8 lb 4.9 oz); 2nd degree perineal laceration and right labial lacerations noted and repaired     Anesthesia: Epidural     QBL: 494 cc    Placenta: Delivered spontaneously intact and sent to pathology     Cord gases: n/a     Complications: None    Procedure:The cervix was noted to be completely dilated, completely effaced, and +2 station. Under continuous fetal heart rate monitoring, the patient was encouraged to push. With good maternal effort she delivered a viable female infant. The head presented in the OA presentation and restituted to TIMO. There was no nuchal cord present. The right anterior fetal shoulder was then easily delivered with a gentle downward motion followed by the posterior fetal shoulder, followed by the remainder of the infant without difficulty. The infant was immediately vigorous. The cord was clamped roughly 5 minutes following delivery per patient request. The cord was cut and the infant was placed on mother's chest. Cord blood was then collected. The placenta then delivered spontaneously intact, and a three vessel cord was noted. Uterine message and pitocin 20 units IV was given until the fundus was firm. The cervix, vagina, perineum, and rectum were carefully inspected for lacerations and second degree perineal laceration going into a right labial laceration noted.The second degree perineal laceration was repaired in standard fashion with 2-0 vicryl in conjunction with a running suture to repair the right labial laceration. A 3-0 vicryl was used to repair a small gap in the second degree repair with a figure of eight suture. Counts for needles, sponges, laps and instruments were correct times two at the end of the delivery. There were no sponges left in the vagina. I was present and scrubbed for the entire delivery. There were  "no major complications. Mother and baby were bonding well at the end of the delivery.    Infant     Findings: female  infant     Infant observations: Weight: 3767 g (8 lb 4.9 oz)   Length: 19  in  Observations/Comments:  scale #2      Apgars: 9  @ 1 minute /    9  @ 5 minutes         Placenta & Cord         Placenta delivered  Spontaneous  at   12/15/2023  6:55 AM     Cord: 3 vessels  present.   Nuchal Cord?  no   Cord blood obtained: Yes    Cord gases obtained:  No    Cord gas results: Venous:  No results found for: \"PHCVEN\", \"BECVEN\"    Arterial:  No results found for: \"PHCART\", \"BECART\"     Repair     Episiotomy: None     No    Lacerations: Yes  Laceration Information  Laceration Repaired?   Perineal: 2nd  Yes    Periurethral:       Labial:       Sulcus:       Vaginal:       Cervical:         Suture used for repair: 2-0 and 3-0 Vicryl  Laceration Length for 3rd or 4th degree lacerations: n/a   Estimated Blood Loss:       Quantitative Blood Loss: Quantitative Blood Loss (mL): 494 mL (12/15/23 0715)        Complications     none    Disposition     Mother to Mother Baby/Postpartum  in stable condition currently.  Baby to remains with mom  in stable condition currently.    Kimberly Lion MD  12/15/23  12:35 EST        "

## 2023-12-15 NOTE — PLAN OF CARE
Problem: Adult Inpatient Plan of Care  Goal: Plan of Care Review  Outcome: Ongoing, Progressing  Flowsheets (Taken 12/15/2023 0743)  Progress: improving  Plan of Care Reviewed With: patient  Outcome Evaluation:   Goal: Patient-Specific Goal (Individualized)  Outcome: Ongoing, Progressing  Goal: Absence of Hospital-Acquired Illness or Injury  Outcome: Ongoing, Progressing  Goal: Optimal Comfort and Wellbeing  Outcome: Ongoing, Progressing  Intervention: Provide Person-Centered Care  Recent Flowsheet Documentation  Taken 12/15/2023 0241 by Magda Keith, RN  Trust Relationship/Rapport:   care explained   choices provided   emotional support provided   empathic listening provided   questions answered   questions encouraged   reassurance provided   thoughts/feelings acknowledged  Taken 2023 1930 by Magda Keith RN  Trust Relationship/Rapport:   care explained   choices provided   emotional support provided   empathic listening provided   questions answered   questions encouraged   reassurance provided   thoughts/feelings acknowledged  Goal: Readiness for Transition of Care  Outcome: Ongoing, Progressing     Problem: Bleeding (Labor)  Goal: Hemostasis  Outcome: Ongoing, Progressing     Problem: Change in Fetal Wellbeing (Labor)  Goal: Stable Fetal Wellbeing  Outcome: Ongoing, Progressing     Problem: Delayed Labor Progression (Labor)  Goal: Effective Progression to Delivery  Outcome: Ongoing, Progressing     Problem: Infection (Labor)  Goal: Absence of Infection Signs and Symptoms  Outcome: Ongoing, Progressing     Problem: Labor Pain (Labor)  Goal: Acceptable Pain Control  Outcome: Ongoing, Progressing     Problem: Uterine Tachysystole (Labor)  Goal: Normal Uterine Contraction Pattern  Outcome: Ongoing, Progressing     Problem: Pain Acute  Goal: Acceptable Pain Control and Functional Ability  Outcome: Ongoing, Progressing     Problem: Skin Injury Risk Increased  Goal: Skin Health and Integrity  Outcome:  Ongoing, Progressing     Problem: Device-Related Complication Risk (Anesthesia/Analgesia, Neuraxial)  Goal: Safe Infusion Delivery Completion  Outcome: Ongoing, Progressing     Problem: Infection (Anesthesia/Analgesia, Neuraxial)  Goal: Absence of Infection Signs and Symptoms  Outcome: Ongoing, Progressing     Problem: Nausea and Vomiting (Anesthesia/Analgesia, Neuraxial)  Goal: Nausea and Vomiting Relief  Outcome: Ongoing, Progressing     Problem: Pain (Anesthesia/Analgesia, Neuraxial)  Goal: Effective Pain Control  Outcome: Ongoing, Progressing     Problem: Respiratory Compromise (Anesthesia/Analgesia, Neuraxial)  Goal: Effective Oxygenation and Ventilation  Outcome: Ongoing, Progressing     Problem: Sensorimotor Impairment (Anesthesia/Analgesia, Neuraxial)  Goal: Baseline Motor Function  Outcome: Ongoing, Progressing     Problem: Urinary Retention (Anesthesia/Analgesia, Neuraxial)  Goal: Effective Urinary Elimination  Outcome: Ongoing, Progressing   Goal Outcome Evaluation:  Plan of Care Reviewed With: patient        Progress: improving  Outcome Evaluation: VALENTINO

## 2023-12-15 NOTE — ANESTHESIA POSTPROCEDURE EVALUATION
Patient: Jessica Chin    Procedure Summary       Date: 12/15/23 Room / Location:     Anesthesia Start: 0202 Anesthesia Stop: 0648    Procedure: LABOR ANALGESIA Diagnosis:     Scheduled Providers:  Provider: Juan Crespo MD    Anesthesia Type: epidural ASA Status: 2            Anesthesia Type: epidural    Vitals  Vitals Value Taken Time   /61 12/15/23 0645   Temp 36.7 °C (98.1 °F) 12/15/23 0445   Pulse 86 12/15/23 0645   Resp 16 12/15/23 0445   SpO2 100 % 12/15/23 0529   Vitals shown include unfiled device data.        Post Anesthesia Care and Evaluation    Level of consciousness: awake and alert  Pain management: adequate    Airway patency: patent  Anesthetic complications: No anesthetic complications  PONV Status: controlled  Cardiovascular status: blood pressure returned to baseline and acceptable  Respiratory status: acceptable  Hydration status: acceptable

## 2023-12-15 NOTE — ANESTHESIA PROCEDURE NOTES
Labor Epidural      Patient reassessed immediately prior to procedure    Patient location during procedure: OB  Start Time: 12/15/2023 2:02 AM  Performed By  Anesthesiologist: Juan Crespo MD  Preanesthetic Checklist  Completed: patient identified, risks and benefits discussed and timeout performed  Prep:  Pt Position:sitting  Sterile Tech:cap, gloves, mask and sterile barrier  Prep:chlorhexidine gluconate and isopropyl alcohol  Monitoring:blood pressure monitoring, continuous pulse oximetry and EKG  Epidural Block Procedure:  Approach:midline  Guidance:landmark technique and palpation technique  Location:L3-L4  Needle Type:Tuohy  Needle Gauge:17 G  Loss of Resistance Medium: saline  Loss of Resistance: 6cm  Cath Depth at skin:12 cm  Paresthesia: none  Aspiration:negative  Test Dose:negative  Number of Attempts: 1  Post Assessment:  Dressing:occlusive dressing applied and secured with tape  Pt Tolerance:patient tolerated the procedure well with no apparent complications  Complications:no

## 2023-12-15 NOTE — ANESTHESIA PREPROCEDURE EVALUATION
Anesthesia Evaluation     Patient summary reviewed and Nursing notes reviewed                Airway   Mallampati: II  TM distance: >3 FB  Neck ROM: full  Dental      Pulmonary - negative pulmonary ROS   Cardiovascular - negative cardio ROS        Neuro/Psych- negative ROS  GI/Hepatic/Renal/Endo - negative ROS     Musculoskeletal (-) negative ROS    Abdominal    Substance History - negative use     OB/GYN    (+) Pregnant, Preeclampsia, pregnancy induced hypertension        Other - negative ROS                   Anesthesia Plan    ASA 2     epidural     (38w6d)    Anesthetic plan, risks, benefits, and alternatives have been provided, discussed and informed consent has been obtained with: patient.    CODE STATUS:    Level Of Support Discussed With: Patient  Code Status (Patient has no pulse and is not breathing): CPR (Attempt to Resuscitate)  Medical Interventions (Patient has pulse or is breathing): Full Support

## 2023-12-16 LAB
BACTERIA SPEC AEROBE CULT: NO GROWTH
BASOPHILS # BLD AUTO: 0.03 10*3/MM3 (ref 0–0.2)
BASOPHILS NFR BLD AUTO: 0.2 % (ref 0–1.5)
DEPRECATED RDW RBC AUTO: 39.8 FL (ref 37–54)
EOSINOPHIL # BLD AUTO: 0.2 10*3/MM3 (ref 0–0.4)
EOSINOPHIL NFR BLD AUTO: 1.3 % (ref 0.3–6.2)
ERYTHROCYTE [DISTWIDTH] IN BLOOD BY AUTOMATED COUNT: 12.1 % (ref 12.3–15.4)
HCT VFR BLD AUTO: 31.7 % (ref 34–46.6)
HGB BLD-MCNC: 11.1 G/DL (ref 12–15.9)
IMM GRANULOCYTES # BLD AUTO: 0.11 10*3/MM3 (ref 0–0.05)
IMM GRANULOCYTES NFR BLD AUTO: 0.7 % (ref 0–0.5)
LYMPHOCYTES # BLD AUTO: 2.57 10*3/MM3 (ref 0.7–3.1)
LYMPHOCYTES NFR BLD AUTO: 17.3 % (ref 19.6–45.3)
MCH RBC QN AUTO: 31.9 PG (ref 26.6–33)
MCHC RBC AUTO-ENTMCNC: 35 G/DL (ref 31.5–35.7)
MCV RBC AUTO: 91.1 FL (ref 79–97)
MONOCYTES # BLD AUTO: 1.14 10*3/MM3 (ref 0.1–0.9)
MONOCYTES NFR BLD AUTO: 7.7 % (ref 5–12)
NEUTROPHILS NFR BLD AUTO: 10.81 10*3/MM3 (ref 1.7–7)
NEUTROPHILS NFR BLD AUTO: 72.8 % (ref 42.7–76)
NRBC BLD AUTO-RTO: 0 /100 WBC (ref 0–0.2)
PLATELET # BLD AUTO: 195 10*3/MM3 (ref 140–450)
PMV BLD AUTO: 10.5 FL (ref 6–12)
RBC # BLD AUTO: 3.48 10*6/MM3 (ref 3.77–5.28)
WBC NRBC COR # BLD AUTO: 14.86 10*3/MM3 (ref 3.4–10.8)

## 2023-12-16 PROCEDURE — 85025 COMPLETE CBC W/AUTO DIFF WBC: CPT | Performed by: STUDENT IN AN ORGANIZED HEALTH CARE EDUCATION/TRAINING PROGRAM

## 2023-12-16 RX ADMIN — TRAMADOL HYDROCHLORIDE 50 MG: 50 TABLET ORAL at 07:46

## 2023-12-16 RX ADMIN — IBUPROFEN 600 MG: 600 TABLET, FILM COATED ORAL at 07:45

## 2023-12-16 RX ADMIN — NICOTINE 1 PATCH: 7 PATCH, EXTENDED RELEASE TRANSDERMAL at 14:15

## 2023-12-16 RX ADMIN — DOCUSATE SODIUM 100 MG: 100 CAPSULE, LIQUID FILLED ORAL at 07:46

## 2023-12-16 RX ADMIN — DOCUSATE SODIUM 100 MG: 100 CAPSULE, LIQUID FILLED ORAL at 23:02

## 2023-12-16 RX ADMIN — IBUPROFEN 600 MG: 600 TABLET, FILM COATED ORAL at 02:33

## 2023-12-16 RX ADMIN — IBUPROFEN 600 MG: 600 TABLET, FILM COATED ORAL at 15:01

## 2023-12-16 RX ADMIN — Medication 1 TABLET: at 07:44

## 2023-12-16 RX ADMIN — IBUPROFEN 600 MG: 600 TABLET, FILM COATED ORAL at 23:01

## 2023-12-16 NOTE — LACTATION NOTE
This note was copied from a baby's chart.  P1 term baby. Mom reports nipples are sore, baby has been nursing every hour and she has supplemented with formula. Her nipples are intact and she is using nipple cream. Baby in nursery at present. Discussed importance of obtaining deep latch on breast versus shallow latch on nipple and encouraged to call for assistance today. No hand pumps or HGP's are available, encouraged Mom to recruit someone in bringing her pump to hospital.
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
## 2023-12-16 NOTE — PROGRESS NOTES
"Subjective:  Postpartum Day 1:     The patient feels well.  Pain is well controlled with current medications. The baby is well.  Urinary output is adequate. The patient is ambulating well. The patient is tolerating a normal diet. Bleeding is progressively improving.      Objective:    Vital signs in last 24 hours:  Blood pressure 137/88, pulse 81, temperature 98.1 °F (36.7 °C), temperature source Oral, resp. rate 18, height 165.1 cm (65\"), last menstrual period 03/18/2023, SpO2 100%, currently breastfeeding.      General:    alert, appears stated age, and cooperative   Uterine Fundus:   firm     Labs    Rh + / Female infant.    Hgb 13.4 --> 11.1    Assessment/Plan:.     Postpartum Day #1  - Continue postpartum course with discharge in morning.      John Hamlin MD     "

## 2023-12-17 VITALS
TEMPERATURE: 97.8 F | SYSTOLIC BLOOD PRESSURE: 134 MMHG | HEART RATE: 91 BPM | DIASTOLIC BLOOD PRESSURE: 78 MMHG | BODY MASS INDEX: 31.78 KG/M2 | RESPIRATION RATE: 18 BRPM | OXYGEN SATURATION: 100 % | HEIGHT: 65 IN

## 2023-12-17 PROCEDURE — 99024 POSTOP FOLLOW-UP VISIT: CPT | Performed by: OBSTETRICS & GYNECOLOGY

## 2023-12-17 RX ORDER — TRAMADOL HYDROCHLORIDE 50 MG/1
50 TABLET ORAL EVERY 6 HOURS PRN
Qty: 12 TABLET | Refills: 0 | Status: SHIPPED | OUTPATIENT
Start: 2023-12-17 | End: 2023-12-20

## 2023-12-17 RX ORDER — LABETALOL 100 MG/1
100 TABLET, FILM COATED ORAL EVERY 12 HOURS SCHEDULED
Status: DISCONTINUED | OUTPATIENT
Start: 2023-12-17 | End: 2023-12-17 | Stop reason: HOSPADM

## 2023-12-17 RX ORDER — LABETALOL 100 MG/1
100 TABLET, FILM COATED ORAL EVERY 12 HOURS SCHEDULED
Qty: 60 TABLET | Refills: 0 | Status: SHIPPED | OUTPATIENT
Start: 2023-12-17

## 2023-12-17 RX ORDER — IBUPROFEN 600 MG/1
600 TABLET ORAL EVERY 6 HOURS PRN
Qty: 28 TABLET | Refills: 0 | Status: SHIPPED | OUTPATIENT
Start: 2023-12-17 | End: 2023-12-24

## 2023-12-17 RX ADMIN — NICOTINE 1 PATCH: 7 PATCH, EXTENDED RELEASE TRANSDERMAL at 08:14

## 2023-12-17 RX ADMIN — DOCUSATE SODIUM 100 MG: 100 CAPSULE, LIQUID FILLED ORAL at 08:12

## 2023-12-17 RX ADMIN — IBUPROFEN 600 MG: 600 TABLET, FILM COATED ORAL at 08:17

## 2023-12-17 RX ADMIN — Medication: at 05:49

## 2023-12-17 RX ADMIN — Medication 1 TABLET: at 08:12

## 2023-12-17 RX ADMIN — LABETALOL HYDROCHLORIDE 100 MG: 100 TABLET, FILM COATED ORAL at 12:10

## 2023-12-17 NOTE — PROGRESS NOTES
Notified by the patient's nurse that her blood pressure just now about 3-4 hours after taking her labetalol was 134/78.  Blood pressure is well-controlled.  She appears stable for discharge home at this time.  I will place discharge orders.

## 2023-12-17 NOTE — DISCHARGE SUMMARY
[facility]         DISCHARGE SUMMARY    Patient Name: Jessica Chin  : 1995  MRN: [id number]    Date of Admission: 2023  Date of Discharge: 2023  Primary Care Physician: Sher Hardy MD    Consults       No orders found from 11/15/2023 to 12/15/2023.            Presenting Problem:   Pregnancy [Z34.90]    Active and Resolved Hospital Problems:  Active Hospital Problems    Diagnosis POA    ** (spontaneous vaginal delivery) [O80] Not Applicable    Mild pre-eclampsia in third trimester [O14.03] Yes      Resolved Hospital Problems    Diagnosis POA    Pregnancy [Z34.90] Not Applicable    Pregnancy [Z34.90] Not Applicable         Hospital Course     Hospital Course:  Jessica Chin is a 28 y.o. female patient was admitted for labor induction at 38+ weeks gestation due to findings of preeclampsia.  Her induction and delivery were unremarkable.  Her postpartum course was largely unremarkable.  On postpartum day #2 she had had some persistent mild range blood pressure elevations.  Labetalol 100 mg was started, and the patient had good effect of her blood pressure.  At that point she was stable for discharge home.  Extensive discharge instructions were given to the patient.  She is advised to check her blood pressure at home and blood pressure parameters are discussed.  Preeclampsia precautions given.  She should follow-up in the office in 1-2 weeks for blood pressure check.      Day of Discharge     Vital Signs:  Temp:  [97.8 °F (36.6 °C)-99 °F (37.2 °C)] 97.8 °F (36.6 °C)  Heart Rate:  [74-91] 91  Resp:  [18] 18  BP: (134-148)/(58-93) 134/78  Physical Exam: See progress notes      Pertinent  and/or Most Recent Results     LAB RESULTS:      Lab 23  0441 23  1521   WBC 14.86* 10.97*   HEMOGLOBIN 11.1* 13.4   HEMATOCRIT 31.7* 40.7   PLATELETS 195 255   NEUTROS ABS 10.81*  --    IMMATURE GRANS (ABS) 0.11*  --    LYMPHS ABS 2.57  --    MONOS ABS 1.14*  --     EOS ABS 0.20  --    MCV 91.1 90.0         Lab 12/14/23  1521   SODIUM 136   POTASSIUM 4.1   CHLORIDE 104   CO2 21.0*   ANION GAP 11.0   BUN 11   CREATININE 0.74   EGFR 114.6   GLUCOSE 105*   CALCIUM 9.1         Lab 12/14/23  1521   TOTAL PROTEIN 5.9*   ALBUMIN 3.7   GLOBULIN 2.2   ALT (SGPT) 20   AST (SGOT) 20   BILIRUBIN 0.2   ALK PHOS 173*                 Lab 12/14/23  1521   ABO TYPING O   RH TYPING Positive   ANTIBODY SCREEN Negative         Brief Urine Lab Results  (Last result in the past 365 days)        Color   Clarity   Blood   Leuk Est   Nitrite   Protein   CREAT   Urine HCG        12/14/23 1522 Yellow   Clear   Trace   Negative   Negative   30 mg/dL (1+)                 Microbiology Results (last 10 days)       Procedure Component Value - Date/Time    Urine Culture - Urine, Urine, Clean Catch [[id number]]  (Normal) Collected: 12/14/23 1522    Lab Status: Final result Specimen: Urine, Clean Catch Updated: 12/16/23 0150     Urine Culture No growth                             Labs Pending at Discharge:  Not applicable      Discharge Details        Discharge Medications        New Medications        Instructions Start Date   ibuprofen 600 MG tablet  Commonly known as: ADVIL,MOTRIN   Take 1 tablet by mouth Every 6 (Six) Hours As Needed for Mild Pain for up to 7 days.      labetalol 100 MG tablet  Commonly known as: NORMODYNE   100 mg, Oral, Every 12 Hours Scheduled      traMADol 50 MG tablet  Commonly known as: ULTRAM   50 mg, Oral, Every 6 Hours PRN             Continue These Medications        Instructions Start Date   prenatal vitamin 27-0.8 27-0.8 MG tablet tablet   Oral, Daily               Allergies   Allergen Reactions    Azithromycin Anaphylaxis and Swelling     Had to be hospitalized with tongue and throat swelling      Latex Hives         Discharge Disposition:  Home or Self Care    Diet:  Hospital:  Diet Order   Procedures    Diet: Regular/House Diet; Texture: Regular Texture (IDDSI 7); Fluid  Consistency: Thin (IDDSI 0)         Discharge Activity:   Gradually resume normal activities over the next 1-2 weeks.  It is normal to have light to moderate vaginal bleeding for up to 6 weeks after delivery.  You may shower.  You may do sitz bath 3 times a day as needed.  Continue perineal care as you have been doing in the hospital.  No submerging in a bathtub for 6 weeks after delivery.  Do not resume sexual intercourse for 6 weeks.  Call the office or return to the hospital for temperature greater than 100.5°F, heavy vaginal bleeding soaking a pad an hour, severe abdominal or vaginal  pain not improved with pain medication, persistent nausea and vomiting, wound breakdown, or any other medical concerns.  Follow-up with your OB/GYN in 1-2 weeks.  Please call the office to make an appointment.          CODE STATUS:  Code Status and Medical Interventions:   Ordered at: 12/15/23 1059     Code Status (Patient has no pulse and is not breathing):    CPR (Attempt to Resuscitate)     Medical Interventions (Patient has pulse or is breathing):    Full         Future Appointments   Date Time Provider Department Center   12/18/2023 11:30 AM Jabari Santa MD [facility]       Additional Instructions for the Follow-ups that You Need to Schedule       Discharge Follow-up with Specified Provider: your OBGYN; 1 Week   As directed      To: your OBGYN   Follow Up: 1 Week                Time spent on Discharge including face to face service: Greater than 30 minutes    Seth Hankins MD

## 2023-12-17 NOTE — PROGRESS NOTES
"Assessment/Plan:  Status post Vaginal Delivery, PPD 2, . Doing well postpatum.   2.  Preeclampsia, postpartum blood pressures persistently in the mild range last 24 hours    Plan:  1.  Patient with persistent mild range blood pressures 140s over 90s in the last 24 hours.  I would recommend starting oral labetalol to help with blood pressure elevations.  I feel that if her blood pressure is stable later this afternoon patient can be discharged home at that time.  Patient is agreeable to the plan.  2.  In preparation for discharge, extensive discharge instructions given to the patient.  Her mother is a nurse, and they state that they will be able to check her blood pressures closely at home.  Blood pressure parameters discussed.      Rh: No results found for: \"ABORH\"  Rh positive  Rubella:Immune  Infant:Female    Subjective:  Postpartum Day 2: Vaginal Delivery    The patient feels well.Pain is well controlled with current medications. Urinary output is adequate. The patient is ambulating well. The patient is tolerating a normal diet. Flatus has been passed.    Objective:  Vital signs in last 24 hours:  Temp:  [97.8 °F (36.6 °C)-99 °F (37.2 °C)] 97.8 °F (36.6 °C)  Heart Rate:  [74-87] 77  Resp:  [18] 18  BP: (142-148)/(58-93) 145/89    No intake/output data recorded.  No intake/output data recorded.          General:    alert, appears stated age, and cooperative   Uterine Fundus:   firm   Abdomen:  Soft, nontender, nondistended   DVT Evaluation:  No evidence of DVT seen on physical exam.     Lab Results   Component Value Date    WBC 14.86 (H) 12/16/2023    HGB 11.1 (L) 12/16/2023    HCT 31.7 (L) 12/16/2023    MCV 91.1 12/16/2023     12/16/2023       Lab Results   Component Value Date    GLUCOSE 105 (H) 12/14/2023    BUN 11 12/14/2023    CREATININE 0.74 12/14/2023    EGFRRESULT 135.5 10/11/2023    EGFR 114.6 12/14/2023    BCR 14.9 12/14/2023    K 4.1 12/14/2023    CO2 21.0 (L) 12/14/2023    CALCIUM 9.1 12/14/2023 "    PROTENTOTREF 5.8 (L) 10/11/2023    ALBUMIN 3.7 12/14/2023    BILITOT 0.2 12/14/2023    AST 20 12/14/2023    ALT 20 12/14/2023

## 2023-12-17 NOTE — PLAN OF CARE
Goal Outcome Evaluation:  Plan of Care Reviewed With: patient        Progress: improving  Outcome Evaluation: VSS. BP elevated but below parameters.  Fundal assessment and lochia, wnl.

## 2023-12-17 NOTE — PLAN OF CARE
Goal Outcome Evaluation:   Vitals, fundus, lochia WNL, BP mildly elevated, but stable. Voiding freely. Ambulating independently. Breastfeeding and supplementing formula. Pain controlled with PRN medications. Plan for discharge today.

## 2023-12-17 NOTE — DISCHARGE INSTRUCTIONS
Call OB for any questions or concerns!  - Complete vaginal rest for 6 weeks. No tub baths, swimming pools, submerging in any kind of water.  - Report any heavy bleeding (saturating 1 pad/hour or passing large sized clots). Bleeding may last up to 6 weeks.  - Report any severe pain, foul smelling vaginal discharge, severe nausea/vomiting, severe headache, or vision changes.  - Wear tight fitting bras, ice packs, and keep back to shower as much as possible to help decrease milk supply.   - Milk comes in day 3-5   - Mastitis s/sx: fever > 101, red streaks at breast  - S/sx of DVT: swelling, redness, warmth, pain behind calf (usually 1-sided)  - No driving for 1 week   - No heavy lifting, Gradually get into normal routine. No exercise until cleared by MD.

## 2023-12-18 ENCOUNTER — TELEPHONE (OUTPATIENT)
Dept: OBSTETRICS AND GYNECOLOGY | Facility: CLINIC | Age: 28
End: 2023-12-18
Payer: OTHER GOVERNMENT

## 2023-12-18 NOTE — PAYOR COMM NOTE
[facility]    &  [facility] [place]  [address]     [address] [place] [address]     [place] [address]  NPI: [id number]     NPI: [id number]  Tax ID: [id number]     Tax ID: [id number]    Segun Briones - [phone]  Utilization Review/Room Reservations  Phone: Foedk-[phone], Ofuybk-[phone], Gpgfizp-[phone], Dory [phone], Mariama [phone] or [phone]  Fax: [phone]  Email: [email or web address]  Please call, fax back, or email with authorization or any questions! Thanks!      REQUESTED CLINICAL  AUTH#[id number]  FAX#[phone]        This fax contains any of the following:  Face Sheet, H&P, progress notes, consults, orders, meds, lab results, labor record, vitals, delivery worksheet, op note, d/c summary.  The information contained in this fax is confidential for the use of the Individual or entity named above. If the reader of this message is not the Intended recipient (or the employee or agent responsible to deliver it to the Intended recipient), you are hereby notified that any dissemination, distribution, or copy of this communication is prohibited. If you have received this communication in error, please notify us by collect telephone call and return the original message to us at the above address at our expense.  Jessica Chin (28 y.o. Female)       Date of Birth   1995    Social Security Number       Address   [address] [place] [address] [place] [address]    Home Phone   [phone]    MRN   [id number]       Yazidi   None    Marital Status   Single                            Admission Date   12/14/23    Admission Type   Elective    Admitting Provider   Jabari Santa MD    Attending Provider       Department, Room/Bed   [facility], S321/1       Discharge Date   12/17/2023    Discharge Disposition   Home or Self Care    Discharge Destination                                 Attending  "Provider: (none)   Allergies: Azithromycin, Latex    Isolation: None   Infection: None   Code Status: Prior    Ht: 165.1 cm (65\")   Wt: 86.6 kg (191 lb)    Admission Cmt: None   Principal Problem:  (spontaneous vaginal delivery) [O80]                   Active Insurance as of 2023       Primary Coverage       Payor Plan Insurance Group Employer/Plan Group    [facility] [id number]       Payor Plan Address Payor Plan Phone Number Payor Plan Fax Number Effective Dates    [address] [address]   2023 - None Entered    [facility] [id number]         Subscriber Name Subscriber Birth Date Member ID       JENNIFER ANGEL 1995 [id number]                     Emergency Contacts        (Rel.) Home Phone Work Phone Mobile Phone    Mariama Bains (Mother) -- -- [phone]                 History & Physical        Kimberly Lion MD at 23 1921          [facility]  Obstetric History and Physical    Chief Complaint   Patient presents with    Elevated Blood Pressure     Outpatient: Patient sent from the office for elvated BPs. Denies visual change or RUQ pain. Reports a HA but has not taken any medication.       Patient is a 28 y.o. female  currently at 38w5d, who presents from the office with elevated blood pressures for evaluation of preeclampsia. She was observed for several hours and was noted to have mild range blood pressures during that time and an elevated P/C ratio 577.4. She thus ruled in for preeclampsia without severe features. She does endorse a mild headache that has been present for the last 3 weeks for which she has not taken any medication. Denies vision changes, chest pain, shortness of breath, or RUQ pain. She has irregular mild contractions. She had mild leakage of fluid over the last 3 days but had negative Nitrazine in the office. Denies vaginal bleeding. She reports active fetal movement.    Her prenatal care was with Dr. Santa and was " complicated by GBS positive status, vapes nicotine containing substances.   She is concerned regarding taking penicillin and having allergic reaction since she has never taken the medication and her sister has that allergy and many others. She has taken a cephalosporin before and tolerated it well.         External Prenatal Results       Pregnancy Outside Results - Transcribed From Office Records - See Scanned Records For Details       Test Value Date Time    ABO  O  12/14/23 1521    Rh  Positive  12/14/23 1521    Antibody Screen  Negative  12/14/23 1521       Negative  10/25/23 1135       Negative  05/09/23 1539    Varicella IgG       Rubella  2.48 index 05/09/23 1539    Hgb  13.4 g/dL 12/14/23 1521       12.6 g/dL 10/25/23 1135       12.6 g/dL 10/11/23 1208       11.5 g/dL 09/25/23 1518       12.5 g/dL 05/09/23 1539    Hct  40.7 % 12/14/23 1521       37.3 % 10/25/23 1135       37.3 % 10/11/23 1208       33.7 % 09/25/23 1518       37.6 % 05/09/23 1539    Glucose Fasting GTT       Glucose Tolerance Test 1 hour       Glucose Tolerance Test 3 hour       Gonorrhea (discrete)  Negative  05/09/23 1521    Chlamydia (discrete)  Negative  05/09/23 1521    RPR  Non Reactive  05/09/23 1539    VDRL       Syphilis Antibody       HBsAg  Negative  05/09/23 1539    Herpes Simplex Virus PCR       Herpes Simplex VIrus Culture       HIV  Non Reactive  05/09/23 1539    Hep C RNA Quant PCR       Hep C Antibody  Non Reactive  05/09/23 1539    AFP       Group B Strep  Positive  11/29/23 1405    GBS Susceptibility to Clindamycin       GBS Susceptibility to Erythromycin       Fetal Fibronectin       Genetic Testing, Maternal Blood                 Drug Screening       Test Value Date Time    Urine Drug Screen       Amphetamine Screen  Negative ng/mL 05/09/23 1521    Barbiturate Screen  Negative ng/mL 05/09/23 1521    Benzodiazepine Screen  Negative ng/mL 05/09/23 1521    Methadone Screen  Negative ng/mL 05/09/23 1521    Phencyclidine Screen   Negative ng/mL 23 1521    Opiates Screen       THC Screen       Cocaine Screen       Propoxyphene Screen  Negative ng/mL 23 1521    Buprenorphine Screen       Methamphetamine Screen       Oxycodone Screen       Tricyclic Antidepressants Screen                 Legend    ^: Historical                               OB History    Para Term  AB Living   1 0 0 0 0 0   SAB IAB Ectopic Molar Multiple Live Births   0 0 0 0 0 0      # Outcome Date GA Lbr Ken[place]/2nd Weight Sex Delivery Anes PTL Lv   1 Current                    Past Medical History:   Diagnosis Date    Abnormal Pap smear of cervix           Past Surgical History:   Procedure Laterality Date    TONSILLECTOMY      WISDOM TOOTH EXTRACTION            No current facility-administered medications on file prior to encounter.     Current Outpatient Medications on File Prior to Encounter   Medication Sig Dispense Refill    Prenatal Vit-Fe Fumarate-FA (prenatal vitamin 27-0.8) 27-0.8 MG tablet tablet Take  by mouth Daily.            Allergies   Allergen Reactions    Azithromycin Anaphylaxis and Swelling     Had to be hospitalized with tongue and throat swelling      Latex Hives          Social History     Socioeconomic History    Marital status: Single   Tobacco Use    Smoking status: Never    Smokeless tobacco: Never   Vaping Use    Vaping Use: Some days    Substances: Nicotine   Substance and Sexual Activity    Alcohol use: No    Drug use: Not Currently     Types: Marijuana    Sexual activity: Yes     Partners: Male     Birth control/protection: Pill          Family History   Problem Relation Age of Onset    Polycystic ovary syndrome Sister     Diabetes Paternal Grandmother     Diabetes Paternal Grandfather         Review of Systems   Constitutional:  Negative for chills and fever.   Eyes:  Negative for visual disturbance.   Respiratory:  Negative for shortness of breath.    Cardiovascular:  Positive for leg swelling. Negative for chest pain.  "  Gastrointestinal:  Negative for abdominal pain.   Genitourinary:  Positive for vaginal discharge. Negative for vaginal bleeding.   Neurological:  Positive for headache.       /89   Pulse 86   Temp 98.1 °F (36.7 °C) (Oral)   Resp 16   Ht 165.1 cm (65\")   LMP 03/18/2023   SpO2 98%   Breastfeeding Yes   BMI 31.78 kg/m²     Physical Exam  Vitals reviewed.   Constitutional:       General: She is not in acute distress.  HENT:      Head: Normocephalic and atraumatic.      Right Ear: External ear normal.      Left Ear: External ear normal.   Eyes:      Extraocular Movements: Extraocular movements intact.      Pupils: Pupils are equal, round, and reactive to light.   Cardiovascular:      Rate and Rhythm: Normal rate.   Pulmonary:      Effort: Pulmonary effort is normal. No respiratory distress.   Abdominal:      General: There is no distension.      Palpations: Abdomen is soft.      Tenderness: There is no abdominal tenderness. There is no guarding or rebound.      Comments: Gravid, 7-7.5#   Musculoskeletal:         General: No deformity. Normal range of motion.      Cervical back: Normal range of motion and neck supple.   Skin:     General: Skin is warm and dry.   Neurological:      General: No focal deficit present.      Mental Status: She is alert and oriented to person, place, and time.   Psychiatric:         Mood and Affect: Mood normal.         Behavior: Behavior normal.           FHT - 130s baseline, moderate variability, accels +, decels -   Campbell - Q 3-4 mins    Lab Results   Component Value Date    WBC 10.97 (H) 12/14/2023    HGB 13.4 12/14/2023    HCT 40.7 12/14/2023    MCV 90.0 12/14/2023     12/14/2023        Lab Results   Component Value Date    GLUCOSE 105 (H) 12/14/2023    BUN 11 12/14/2023    CREATININE 0.74 12/14/2023    EGFRRESULT 135.5 10/11/2023    EGFR 114.6 12/14/2023    BCR 14.9 12/14/2023    K 4.1 12/14/2023    CO2 21.0 (L) 12/14/2023    CALCIUM 9.1 12/14/2023    PROTENTOTREF " 5.8 (L) 10/11/2023    ALBUMIN 3.7 2023    BILITOT 0.2 2023    AST 20 2023    ALT 20 2023         Assessment:  1.  Intrauterine pregnancy at 38w5d weeks gestation with reactive fetal status.    2.  Induction of labor for preeclampsia without severe features with favorable cervix   3.  Obstetrical history significant for is remarkable for preeclampsia without severe features, nicotine use, GBS positive status  4.  GBS status: positive     Plan:  Will admit the patient to L&D for induction of labor after the patient's workup for preeclampsia ruled her in for preeclampsia without severe features. I reviewed her blood pressures and preeclampsia labs that demonstrate no evidence of severe features and since she has had a persistent headache for 3 weeks, I do not suspect this is a severe feature and that she needs magnesium sulfate for seizure prophylaxis at this time. Will proceed with induction of labor with one dose of cytotec followed by pitocin augmentation. Given her concern for penicillin reaction and tolerance of cephalosporin, will start Ancef for GBS prophylaxis. I discussed this plan of care with the patient and her family and she agrees. All questions and concerns answered. Anticipate a vaginal delivery. Discussed that anesthesia is available for epidural if patient desires.           Kimberly Lion MD  2023  19:21 EST      Electronically signed by Kimberly Lion MD at 23 1949          Operative/Procedure Notes (all)        Kimberly Lion MD at 12/15/23 1235  Version 1 of 1          [facility]   Vaginal Delivery Note    Patient Name: Jessica Chin  : 1995  MRN: [id number]    Date of Delivery: 12/15/2023     Diagnosis     Pre & Post-Delivery:  Intrauterine pregnancy at 38w6d  GBS positive status   Labor status: Induced Onset of Labor     Mild pre-eclampsia in third trimester     (spontaneous vaginal delivery)             Problem List    Transfer to  Postpartum     Review the Delivery Report for details.     Delivery     Delivery: Vaginal, Spontaneous     YOB: 2021    Time of Birth:  Gestational Age 6:48 AM   38w6d     Anesthesia: Epidural     Delivering clinician: Kimberly Lion    Forceps?   No   Vacuum? No    Shoulder dystocia present: No        Delivery narrative:        Procedure: Spontaneous vaginal delivery    Surgeon: Kimberly Lion MD    Preop diagnosis:  28 y.o.  year old  in active labor at 38w6d with pregnancy complicated by preeclampsia without severe features and GBS positive status        Postop diagnosis: Same    Indications: Jessica Chin is a 28 y.o.  at 38w6d who presented yesterday from the office following elevated blood pressures in the office. She was observed for several hours on L&D with ongoing mild range blood pressures and elevated P/C ratio of 577.4, ruling her in for preeclampsia without severe features. She was then counseled on recommendation for induction of labor and she agreed. Her cervix was favorable at 2/70/-2 and she was given one dose of cytotec for cervical ripening. She made no progress following dose on recheck but was tachysystole on tocometry. She underwent amniotomy at 2300 with clear fluid noted and cervix progressed to 3/70/-2 after rupture. An IUPC was then placed to monitor contractions. She remained tachysystole after AROM and Terbutaline 0.25 mg SQ was given. Her contraction pattern then improved and she continued to progress without further augmentation. She received an epidural at 5-6 cm dilation. She continued to progress to complete dilation at 0529. She then pushed for an 1 hour and 15 minutes.     Findings: Female infant, Apgar 9/9, Weight 3767 g (8 lb 4.9 oz); 2nd degree perineal laceration and right labial lacerations noted and repaired     Anesthesia: Epidural     QBL: 494 cc    Placenta: Delivered spontaneously intact and sent to pathology     Cord gases: n/a      Complications: None    Procedure:The cervix was noted to be completely dilated, completely effaced, and +2 station. Under continuous fetal heart rate monitoring, the patient was encouraged to push. With good maternal effort she delivered a viable female infant. The head presented in the OA presentation and restituted to TIMO. There was no nuchal cord present. The right anterior fetal shoulder was then easily delivered with a gentle downward motion followed by the posterior fetal shoulder, followed by the remainder of the infant without difficulty. The infant was immediately vigorous. The cord was clamped roughly 5 minutes following delivery per patient request. The cord was cut and the infant was placed on mother's chest. Cord blood was then collected. The placenta then delivered spontaneously intact, and a three vessel cord was noted. Uterine message and pitocin 20 units IV was given until the fundus was firm. The cervix, vagina, perineum, and rectum were carefully inspected for lacerations and second degree perineal laceration going into a right labial laceration noted.The second degree perineal laceration was repaired in standard fashion with 2-0 vicryl in conjunction with a running suture to repair the right labial laceration. A 3-0 vicryl was used to repair a small gap in the second degree repair with a figure of eight suture. Counts for needles, sponges, laps and instruments were correct times two at the end of the delivery. There were no sponges left in the vagina. I was present and scrubbed for the entire delivery. There were no major complications. Mother and baby were bonding well at the end of the delivery.    Infant     Findings: female  infant     Infant observations: Weight: 3767 g (8 lb 4.9 oz)   Length: 19  in  Observations/Comments:  scale #2      Apgars: 9  @ 1 minute /    9  @ 5 minutes         Placenta & Cord         Placenta delivered  Spontaneous  at   12/15/2023  6:55 AM     Cord: 3 vessels  " present.   Nuchal Cord?  no   Cord blood obtained: Yes    Cord gases obtained:  No    Cord gas results: Venous:  No results found for: \"PHCVEN\", \"BECVEN\"    Arterial:  No results found for: \"PHCART\", \"BECART\"     Repair     Episiotomy: None     No    Lacerations: Yes  Laceration Information  Laceration Repaired?   Perineal: 2nd  Yes    Periurethral:       Labial:       Sulcus:       Vaginal:       Cervical:         Suture used for repair: 2-0 and 3-0 Vicryl  Laceration Length for 3rd or 4th degree lacerations: n/a   Estimated Blood Loss:       Quantitative Blood Loss: Quantitative Blood Loss (mL): 494 mL (12/15/23 0715)        Complications     none    Disposition     Mother to Mother Baby/Postpartum  in stable condition currently.  Baby to remains with mom  in stable condition currently.    Kimberly Lion MD  12/15/23  12:35 EST          Electronically signed by Kimberly Lion MD at 12/15/23 1244          Physician Progress Notes (all)        Seth Haknins MD at 12/17/23 1506          Notified by the patient's nurse that her blood pressure just now about 3-4 hours after taking her labetalol was 134/78.  Blood pressure is well-controlled.  She appears stable for discharge home at this time.  I will place discharge orders.    Electronically signed by Seth Hankins MD at 12/17/23 1507       Seth Hankins MD at 12/17/23 1405          Assessment/Plan:  Status post Vaginal Delivery, PPD 2, . Doing well postpatum.   2.  Preeclampsia, postpartum blood pressures persistently in the mild range last 24 hours    Plan:  1.  Patient with persistent mild range blood pressures 140s over 90s in the last 24 hours.  I would recommend starting oral labetalol to help with blood pressure elevations.  I feel that if her blood pressure is stable later this afternoon patient can be discharged home at that time.  Patient is agreeable to the plan.  2.  In preparation for discharge, " "extensive discharge instructions given to the patient.  Her mother is a nurse, and they state that they will be able to check her blood pressures closely at home.  Blood pressure parameters discussed.      Rh: No results found for: \"ABORH\"  Rh positive  Rubella:Immune  Infant:Female    Subjective:  Postpartum Day 2: Vaginal Delivery    The patient feels well.Pain is well controlled with current medications. Urinary output is adequate. The patient is ambulating well. The patient is tolerating a normal diet. Flatus has been passed.    Objective:  Vital signs in last 24 hours:  Temp:  [97.8 °F (36.6 °C)-99 °F (37.2 °C)] 97.8 °F (36.6 °C)  Heart Rate:  [74-87] 77  Resp:  [18] 18  BP: (142-148)/(58-93) 145/89    No intake/output data recorded.  No intake/output data recorded.          General:    alert, appears stated age, and cooperative   Uterine Fundus:   firm   Abdomen:  Soft, nontender, nondistended   DVT Evaluation:  No evidence of DVT seen on physical exam.     Lab Results   Component Value Date    WBC 14.86 (H) 12/16/2023    HGB 11.1 (L) 12/16/2023    HCT 31.7 (L) 12/16/2023    MCV 91.1 12/16/2023     12/16/2023       Lab Results   Component Value Date    GLUCOSE 105 (H) 12/14/2023    BUN 11 12/14/2023    CREATININE 0.74 12/14/2023    EGFRRESULT 135.5 10/11/2023    EGFR 114.6 12/14/2023    BCR 14.9 12/14/2023    K 4.1 12/14/2023    CO2 21.0 (L) 12/14/2023    CALCIUM 9.1 12/14/2023    PROTENTOTREF 5.8 (L) 10/11/2023    ALBUMIN 3.7 12/14/2023    BILITOT 0.2 12/14/2023    AST 20 12/14/2023    ALT 20 12/14/2023         Electronically signed by Seth Hankins MD at 12/17/23 1974       John Hamlin MD at 12/16/23 1252          Subjective:  Postpartum Day 1:     The patient feels well.  Pain is well controlled with current medications. The baby is well.  Urinary output is adequate. The patient is ambulating well. The patient is tolerating a normal diet. Bleeding is progressively " "improving.      Objective:    Vital signs in last 24 hours:  Blood pressure 137/88, pulse 81, temperature 98.1 °F (36.7 °C), temperature source Oral, resp. rate 18, height 165.1 cm (65\"), last menstrual period 03/18/2023, SpO2 100%, currently breastfeeding.      General:    alert, appears stated age, and cooperative   Uterine Fundus:   firm     Labs    Rh + / Female infant.    Hgb 13.4 --> 11.1    Assessment/Plan:.     Postpartum Day #1  - Continue postpartum course with discharge in morning.      John Hamlin MD       Electronically signed by John Hamlin MD at 12/16/23 1301       Kimberly Lion MD at 12/14/23 2320          OB Progress Note    MD to bedside to assess labor progress following dose of cytotec. Evaluation of the fetal heart tracing notes category 1 FHT with tachysystole. Patient remained 2/70/-2 on repeat check per nursing. We discussed performing amniotomy and placement of IUPC to see if she is truly having tachysystole. Patient gave verbal consent. Amniotomy performed at 2300 with moderate amount of clear fluid noted with [place]. Fetal head well applied. Repeat cervical examination noted to be 3/70/-2. IUPC then placed without difficulty. Patient remained tachysystole following amniotomy and IUPC placement with category 1 FHT. Terbutaline 0.25 mg SQ was then given and the tachysystole improved. Will continue to monitor and if contractions start to space out, will then start pitocin for augmentation.     Kimberly Lion MD      Electronically signed by Kimberly Lion MD at 12/14/23 2330       Consult Notes (all)    No notes of this type exist for this encounter.       Maternal Vitals (last 4 days) before discharge       Date/Time Temp Pulse Resp BP SpO2    12/17/23 1456 -- 91 -- 134/78 --    12/17/23 1108 97.8 (36.6) 77 18 145/89 --    12/17/23 0816 99 (37.2) 87 18 142/93  --    BP: notified nurse at 12/17/23 0816    12/17/23 0318 98.4 (36.9) 74 18 142/82 --    12/16/23 " 2313 98.3 (36.8) 78 18 147/90 --    12/16/23 1553 98 (36.7) 80 18 148/58 --    12/16/23 0910 98.1 (36.7) 81 18 137/88 --    12/15/23 2356 -- -- -- 130/78 --    12/15/23 2341 -- -- -- 152/90  --    BP: rn notified at 12/15/23 2341    12/15/23 2310 98.6 (37) 86 18 -- --    12/15/23 1500 99.3 (37.4) 88 16 138/80 --    12/15/23 1105 97.3 (36.3) 89 18 132/73 --    12/15/23 0945 97.5 (36.4) 77 18 139/76 --    12/15/23 0915 97.9 (36.6) 79 18 115/64 --    12/15/23 0900 -- 85 -- 129/75 --    12/15/23 0845 -- 80 -- 126/76 --    12/15/23 0833 -- 79 -- 131/79 --    12/15/23 0830 -- 79 -- 131/79 --    12/15/23 0815 -- 73 16 132/76 --    12/15/23 0800 -- 77 16 133/75 --    12/15/23 0745 -- 81 16 125/72 --    12/15/23 0730 -- 89 16 135/75 --    12/15/23 0715 97.8 (36.6) 115 16 117/69 --    12/15/23 0701 -- 98 -- 136/53 --    12/15/23 0646 -- 86 -- 172/61 --    12/15/23 0615 -- 76 -- 137/72 --    12/15/23 0601 -- 77 -- 120/96 --    12/15/23 0545 -- 99 -- 144/81 --    12/15/23 0530 -- 88 -- 131/79 100    12/15/23 0525 -- 82 -- -- 100    12/15/23 0520 -- 84 -- -- 100    12/15/23 0515 -- 85 -- 116/69 100    12/15/23 0510 -- 83 -- -- 100    12/15/23 0505 -- 77 -- -- 100    12/15/23 0500 -- 80 -- 122/63 100    12/15/23 0455 -- 83 -- -- 100    12/15/23 0450 -- 84 -- -- 100    12/15/23 0445 98.1 (36.7) 70 16 134/72 100    12/15/23 0440 -- 79 -- -- 100    12/15/23 0435 -- 92 -- -- 100    12/15/23 0431 -- 80 -- 122/67 --    12/15/23 0430 -- 79 -- -- 100    12/15/23 0425 -- 83 -- -- 100    12/15/23 0420 -- 79 -- -- 100    12/15/23 0415 -- 87 -- 129/74 100    12/15/23 0410 -- 83 -- -- 100    12/15/23 0405 -- 78 -- -- 100    12/15/23 0400 -- 84 -- 122/58 --    12/15/23 0359 -- 78 -- -- 97    12/15/23 0355 -- 79 -- -- 96    12/15/23 0350 -- 76 -- -- 100    12/15/23 0345 -- 77 -- 116/54 100    12/15/23 0340 -- 80 -- -- 100    12/15/23 0335 -- 81 -- -- 100    12/15/23 0330 -- 78 -- 126/59 100    12/15/23 0325 -- 83 -- -- 100    12/15/23 0320 --  78 -- -- 100    12/15/23 0316 -- 76 -- 120/54 --    12/15/23 0315 -- 74 -- -- 100    12/15/23 0310 -- 79 -- -- 100    12/15/23 0305 -- 87 -- -- 100    12/15/23 0300 -- 76 -- 139/69 100    12/15/23 0255 -- 77 -- -- 100    12/15/23 0250 -- 74 -- 136/81 100    12/15/23 0245 -- 82 -- 140/79 100    12/15/23 0241 -- 80 -- 145/81 --    12/15/23 0240 -- 80 -- -- 100    12/15/23 0235 -- 80 -- 131/88 100    12/15/23 0230 -- 73 -- 129/82 100    12/15/23 0228 -- 73 -- 137/77 --    12/15/23 0227 -- 73 -- 137/81 --    12/15/23 0225 -- 80 -- 147/71 99    12/15/23 0223 -- 109 -- 80/55 --    12/15/23 0221 -- 76 -- 139/76 --    12/15/23 0220 -- 70 -- -- 94    12/15/23 0215 -- 89 -- -- 96    12/15/23 0005 -- 74 -- 137/81 --    12/14/23 2350 -- 85 -- -- 100    12/14/23 2345 -- 86 -- -- 100    12/14/23 2340 -- 83 -- -- 100    12/14/23 2335 -- 86 -- -- 100    12/14/23 2330 98.2 (36.8) 87 -- -- 100    12/14/23 2325 -- 82 -- -- 100    12/14/23 2254 -- 76 -- 156/88 --    12/14/23 2021 -- 81 -- 144/88 --    12/14/23 1946 -- 75 -- 139/95 --    12/14/23 1930 -- 76 -- 144/80 --    12/14/23 1915 -- 86 -- 147/89 --    12/14/23 1900 -- 81 -- 143/77 --    12/14/23 1845 -- 80 -- 134/81 --    12/14/23 1830 -- 89 -- 143/85 --    12/14/23 1815 -- 83 -- 147/84 --    12/14/23 1800 -- 87 -- 144/84 --    12/14/23 1745 -- 82 -- 139/84 --    12/14/23 1730 -- 86 -- 141/82 --    12/14/23 1715 -- 87 -- 143/75 --    12/14/23 1700 -- 91 -- 144/90 --    12/14/23 1645 -- 90 -- 150/89 --    12/14/23 1630 -- 90 -- 135/76 --    12/14/23 1619 -- 88 -- 140/83 --    12/14/23 1605 -- 86 -- -- 99    12/14/23 1600 -- 85 -- 135/83 98    12/14/23 1555 -- 83 -- -- 98    12/14/23 1545 -- 86 -- 140/86 --    12/14/23 1530 -- 90 -- 141/87 --    12/14/23 1515 -- 98 -- 144/80 --    12/14/23 1512 98.1 (36.7) 93 16 134/79 99          FHR (since admission)       Date/Time Fetal HR Assessment Method Fetal HR (beats/min) Fetal HR Baseline Fetal HR Variability Fetal HR Accelerations Fetal  HR Decelerations Fetal HR Tracing Category    12/15/23 0645 external -- indeterminate  RN and MD at bedside -- -- -- --    12/15/23 0630 external -- -- -- -- -- --    12/15/23 0615 external -- indeterminate -- -- -- --    12/15/23 0600 external -- indeterminate  RN at bedside for delivery -- -- -- --    12/15/23 0545 external 135 normal range moderate (amplitude range 6 to 25 bpm) absent absent --    12/15/23 0530 external 130 normal range moderate (amplitude range 6 to 25 bpm) greater than/equal to 15 bpm;lasting at least 15 seconds absent --    12/15/23 0500 external 125 normal range moderate (amplitude range 6 to 25 bpm) greater than/equal to 15 bpm;lasting at least 15 seconds absent --    12/15/23 0430 external 135 normal range minimal (detectable, amplitude less than or equal to 5 bpm) absent late --    12/15/23 0400 external 130 normal range moderate (amplitude range 6 to 25 bpm) absent late --    12/15/23 0330 external 125 normal range moderate (amplitude range 6 to 25 bpm) greater than/equal to 15 bpm;lasting at least 15 seconds absent --    12/15/23 0300 external 125 normal range moderate (amplitude range 6 to 25 bpm) greater than/equal to 15 bpm;lasting at least 15 seconds early --    12/15/23 0230 external -- indeterminate -- -- -- --    12/15/23 0200 external 125 normal range moderate (amplitude range 6 to 25 bpm) absent absent --    12/15/23 0130 external 125 normal range moderate (amplitude range 6 to 25 bpm) absent absent --    12/15/23 0100 external 130 normal range minimal (detectable, amplitude less than or equal to 5 bpm) absent absent --    12/15/23 0030 external 125 normal range moderate (amplitude range 6 to 25 bpm) greater than/equal to 15 bpm;lasting at least 15 seconds absent --    12/15/23 0000 external 125 normal range moderate (amplitude range 6 to 25 bpm) absent absent --    12/14/23 2330 external 145 normal range moderate (amplitude range 6 to 25 bpm) absent absent --    12/14/23 2300  external 145 normal range moderate (amplitude range 6 to 25 bpm) absent absent --    12/14/23 2230 external 135 normal range moderate (amplitude range 6 to 25 bpm) greater than/equal to 15 bpm;lasting at least 15 seconds absent --    12/14/23 2200 external 130 normal range moderate (amplitude range 6 to 25 bpm) greater than/equal to 15 bpm;lasting at least 15 seconds absent --    12/14/23 2130 external 135 normal range moderate (amplitude range 6 to 25 bpm) greater than/equal to 15 bpm;lasting at least 15 seconds absent --    12/14/23 2100 external 135 normal range moderate (amplitude range 6 to 25 bpm) greater than/equal to 15 bpm;lasting at least 15 seconds absent --    12/14/23 2030 external 150 normal range moderate (amplitude range 6 to 25 bpm) greater than/equal to 15 bpm;lasting at least 15 seconds absent --    12/14/23 2000 external 130 normal range moderate (amplitude range 6 to 25 bpm) greater than/equal to 15 bpm;lasting at least 15 seconds absent --    12/14/23 1930 external 135 normal range moderate (amplitude range 6 to 25 bpm) greater than/equal to 15 bpm;lasting at least 15 seconds absent --    12/14/23 1900 external 130 normal range moderate (amplitude range 6 to 25 bpm) greater than/equal to 15 bpm;lasting at least 15 seconds absent --    12/14/23 1830 external 125 normal range minimal (detectable, amplitude less than or equal to 5 bpm) greater than/equal to 15 bpm;lasting at least 15 seconds absent --    12/14/23 1800 external 130 normal range moderate (amplitude range 6 to 25 bpm) greater than/equal to 15 bpm;lasting at least 15 seconds absent --    12/14/23 1730 external 135 normal range moderate (amplitude range 6 to 25 bpm) greater than/equal to 15 bpm;lasting at least 15 seconds absent --    12/14/23 1700 external 135 normal range moderate (amplitude range 6 to 25 bpm) greater than/equal to 15 bpm;lasting at least 15 seconds absent --    12/14/23 1630 external 135 normal range minimal  (detectable, amplitude less than or equal to 5 bpm)  periods of moderate greater than/equal to 15 bpm;lasting at least 15 seconds absent --    12/14/23 1600 external 135 normal range moderate (amplitude range 6 to 25 bpm) greater than/equal to 15 bpm;lasting at least 15 seconds absent --    12/14/23 1530 external 135 normal range moderate (amplitude range 6 to 25 bpm) greater than/equal to 15 bpm;lasting at least 15 seconds absent --          Uterine Activity (since admission)       Date/Time Method Contraction Frequency (Minutes) Contraction Duration (sec) Contraction Intensity Uterine Resting Tone Contraction Pattern    12/15/23 0645 -- 1-3 40-60 strong by palpation soft by palpation --    12/15/23 0630 -- 2-4  strong by palpation soft by palpation --    12/15/23 0615 -- 1-2  strong by palpation soft by palpation --    12/15/23 0600 -- 2-3 100-160 strong by palpation soft by palpation --    12/15/23 0545 -- 1-5 60-80 strong by palpation soft by palpation --    12/15/23 0530 -- 1-4 50-90 strong by palpation soft by palpation --    12/15/23 0500 -- 1-2 50-80 strong by palpation soft by palpation --    12/15/23 0430 -- 1-3  strong by palpation soft by palpation --    12/15/23 0400 -- 1-5 40-90 strong by palpation soft by palpation --    12/15/23 0330 -- 2-3 60-80 strong by palpation soft by palpation --    12/15/23 0300 -- 1-3 50- 80 strong by palpation soft by palpation --    12/15/23 0230 -- 1-2 40-60 -- -- --    12/15/23 0200 -- 1-2 50-60 strong by palpation soft by palpation --    12/15/23 0130 -- 1-4 60-80 strong by palpation soft by palpation --    12/15/23 0100 -- 1- 1.5 50-80 -- -- --    12/15/23 0030 -- 1-4 50-80 -- -- --    12/15/23 0000 -- 2-3 50-70 moderate by palpation soft by palpation --    12/14/23 2330 IUPC (intrauterine pressure catheter) 1-1.5 50-70 moderate by palpation soft by palpation --    12/14/23 2300 external tocotransducer;IUPC (intrauterine pressure catheter) 1-3   moderate by palpation soft by palpation --    12/14/23 2230 -- 1-2 70-80 moderate by palpation soft by palpation --    12/14/23 2200 -- 1-4 40-50 moderate by palpation soft by palpation --    12/14/23 2130 -- 1-2 60-70 moderate by palpation soft by palpation --    12/14/23 2100 -- 1-2 50-80 moderate by palpation soft by palpation --    12/14/23 2030 -- 1-2 50-80 mild by palpation soft by palpation --    12/14/23 2000 -- 2-7 70 mild by palpation soft by palpation --    12/14/23 1930 external tocotransducer 2-6 80- 110 mild by palpation soft by palpation --    12/14/23 1900 external tocotransducer 3-4  mild by palpation soft by palpation --    12/14/23 1830 external tocotransducer x2 40-70 mild by palpation soft by palpation --    12/14/23 1800 external tocotransducer x3 40-50 mild by palpation soft by palpation --    12/14/23 1730 external tocotransducer 1-5 -- mild by palpation soft by palpation --    12/14/23 1700 external tocotransducer 4-5 -- mild by palpation soft by palpation --    12/14/23 1630 external tocotransducer occasional -- mild by palpation soft by palpation --    12/14/23 1600 external tocotransducer 2-4 -- mild by palpation soft by palpation --    12/14/23 1530 external tocotransducer q3min -- mild by palpation soft by palpation --          Labor Pain (since admission)       Date/Time (0-10) Pain Rating: Rest (0-10) Pain Rating: Activity Pain Management Interventions    12/17/23 0815 5 5 --    12/16/23 2301 5 -- see MAR    12/16/23 1501 5 -- see MAR    12/16/23 0746 5 5 see MAR    12/16/23 0233 -- 8 see MAR    12/15/23 2122 6 -- see MAR    12/15/23 1620 0 0 medication offered but refused    12/15/23 1351 7 7 see MAR    12/15/23 1105 1 1 medication offered but refused    12/15/23 0945 0 0 medication offered but refused    12/15/23 0915 2 2 medication offered but refused    12/15/23 0833 5 10 see MAR    12/15/23 0815 0 0 --    12/15/23 0715 0 0 --    12/15/23 0241 4 4 --    12/15/23 0143 10 10  --    12/15/23 0122 8 8 --    23 2312 8 8 --    23 2300 5 5 --    23 2130 3 3 --    23 1930 3 3 --    23 1800 0 0 --    23 1509 0 0 --             Discharge Summary        Seth Hankins MD at 23 1509                         [facility]         DISCHARGE SUMMARY    Patient Name: Jessica Chin  : 1995  MRN: [id number]    Date of Admission: 2023  Date of Discharge: 2023  Primary Care Physician: Sher Hardy MD    Consults       No orders found from 11/15/2023 to 12/15/2023.            Presenting Problem:   Pregnancy [Z34.90]    Active and Resolved Hospital Problems:  Active Hospital Problems    Diagnosis POA    ** (spontaneous vaginal delivery) [O80] Not Applicable    Mild pre-eclampsia in third trimester [O14.03] Yes      Resolved Hospital Problems    Diagnosis POA    Pregnancy [Z34.90] Not Applicable    Pregnancy [Z34.90] Not Applicable         Hospital Course     Hospital Course:  Jessica Chin is a 28 y.o. female patient was admitted for labor induction at 38+ weeks gestation due to findings of preeclampsia.  Her induction and delivery were unremarkable.  Her postpartum course was largely unremarkable.  On postpartum day #2 she had had some persistent mild range blood pressure elevations.  Labetalol 100 mg was started, and the patient had good effect of her blood pressure.  At that point she was stable for discharge home.  Extensive discharge instructions were given to the patient.  She is advised to check her blood pressure at home and blood pressure parameters are discussed.  Preeclampsia precautions given.  She should follow-up in the office in 1-2 weeks for blood pressure check.      Day of Discharge     Vital Signs:  Temp:  [97.8 °F (36.6 °C)-99 °F (37.2 °C)] 97.8 °F (36.6 °C)  Heart Rate:  [74-91] 91  Resp:  [18] 18  BP: (134-148)/(58-93) 134/78  Physical Exam: See progress notes      Pertinent  and/or Most  Recent Results     LAB RESULTS:      Lab 12/16/23  0441 12/14/23  1521   WBC 14.86* 10.97*   HEMOGLOBIN 11.1* 13.4   HEMATOCRIT 31.7* 40.7   PLATELETS 195 255   NEUTROS ABS 10.81*  --    IMMATURE GRANS (ABS) 0.11*  --    LYMPHS ABS 2.57  --    MONOS ABS 1.14*  --    EOS ABS 0.20  --    MCV 91.1 90.0         Lab 12/14/23  1521   SODIUM 136   POTASSIUM 4.1   CHLORIDE 104   CO2 21.0*   ANION GAP 11.0   BUN 11   CREATININE 0.74   EGFR 114.6   GLUCOSE 105*   CALCIUM 9.1         Lab 12/14/23  1521   TOTAL PROTEIN 5.9*   ALBUMIN 3.7   GLOBULIN 2.2   ALT (SGPT) 20   AST (SGOT) 20   BILIRUBIN 0.2   ALK PHOS 173*                 Lab 12/14/23  1521   ABO TYPING O   RH TYPING Positive   ANTIBODY SCREEN Negative         Brief Urine Lab Results  (Last result in the past 365 days)        Color   Clarity   Blood   Leuk Est   Nitrite   Protein   CREAT   Urine HCG        12/14/23 1522 Yellow   Clear   Trace   Negative   Negative   30 mg/dL (1+)                 Microbiology Results (last 10 days)       Procedure Component Value - Date/Time    Urine Culture - Urine, Urine, Clean Catch [[id number]]  (Normal) Collected: 12/14/23 1522    Lab Status: Final result Specimen: Urine, Clean Catch Updated: 12/16/23 0150     Urine Culture No growth                             Labs Pending at Discharge:  Not applicable      Discharge Details        Discharge Medications        New Medications        Instructions Start Date   ibuprofen 600 MG tablet  Commonly known as: ADVIL,MOTRIN   Take 1 tablet by mouth Every 6 (Six) Hours As Needed for Mild Pain for up to 7 days.      labetalol 100 MG tablet  Commonly known as: NORMODYNE   100 mg, Oral, Every 12 Hours Scheduled      traMADol 50 MG tablet  Commonly known as: ULTRAM   50 mg, Oral, Every 6 Hours PRN             Continue These Medications        Instructions Start Date   prenatal vitamin 27-0.8 27-0.8 MG tablet tablet   Oral, Daily               Allergies   Allergen Reactions    Azithromycin  Anaphylaxis and Swelling     Had to be hospitalized with tongue and throat swelling      Latex Hives         Discharge Disposition:  Home or Self Care    Diet:  Hospital:  Diet Order   Procedures    Diet: Regular/House Diet; Texture: Regular Texture (IDDSI 7); Fluid Consistency: Thin (IDDSI 0)         Discharge Activity:   Gradually resume normal activities over the next 1-2 weeks.  It is normal to have light to moderate vaginal bleeding for up to 6 weeks after delivery.  You may shower.  You may do sitz bath 3 times a day as needed.  Continue perineal care as you have been doing in the hospital.  No submerging in a bathtub for 6 weeks after delivery.  Do not resume sexual intercourse for 6 weeks.  Call the office or return to the hospital for temperature greater than 100.5°F, heavy vaginal bleeding soaking a pad an hour, severe abdominal or vaginal  pain not improved with pain medication, persistent nausea and vomiting, wound breakdown, or any other medical concerns.  Follow-up with your OB/GYN in 1-2 weeks.  Please call the office to make an appointment.          CODE STATUS:  Code Status and Medical Interventions:   Ordered at: 12/15/23 1059     Code Status (Patient has no pulse and is not breathing):    CPR (Attempt to Resuscitate)     Medical Interventions (Patient has pulse or is breathing):    Full         Future Appointments   Date Time Provider Department Center   12/18/2023 11:30 AM Jabari Santa MD [facility]       Additional Instructions for the Follow-ups that You Need to Schedule       Discharge Follow-up with Specified Provider: your OBGYN; 1 Week   As directed      To: your OBGYN   Follow Up: 1 Week                Time spent on Discharge including face to face service: Greater than 30 minutes    Seth Hankins MD        Electronically signed by Seth Hankins MD at 12/17/23 2263

## 2023-12-18 NOTE — TELEPHONE ENCOUNTER
Kiet pt called stating she was told to come in a week after he vaginal delivery, on 12/15/23. Pt stated you were the provider on call and who delivered her baby, and pt was wondering if since Kiet is retiring, if you could be her provider. Are you able to take her under your car?     Please advise, thank you

## 2023-12-26 ENCOUNTER — POSTPARTUM VISIT (OUTPATIENT)
Dept: OBSTETRICS AND GYNECOLOGY | Facility: CLINIC | Age: 28
End: 2023-12-26
Payer: OTHER GOVERNMENT

## 2023-12-26 VITALS
HEIGHT: 65 IN | SYSTOLIC BLOOD PRESSURE: 130 MMHG | DIASTOLIC BLOOD PRESSURE: 81 MMHG | BODY MASS INDEX: 27.49 KG/M2 | WEIGHT: 165 LBS

## 2023-12-26 DIAGNOSIS — O14.00 ANTEPARTUM MILD PREECLAMPSIA: ICD-10-CM

## 2023-12-26 DIAGNOSIS — Z01.30 BP CHECK: ICD-10-CM

## 2023-12-28 NOTE — PROGRESS NOTES
"Continued Stay Note  [facility]     Patient Name: Jessica Chin  MRN: [id number]  Today's Date: 12/28/2023    Admit Date: 12/14/2023    Plan: Infant may discharge to mother when medically ready; CSW will follow cord. GEETHA Marques.   Discharge Plan       Row Name 12/28/23 1305       Plan    Plan Comments Mother: Jessica Chin, MRN: [id number]; infant: Jonas \"Solange\" Elsy, MRN: [id number]. CSW reviewed cord toxicology for infant, and it was positive for Morphine; this is lab confirmed. Of note, mother received an morphine injection while in labor and delivery; mandated CPS reporting is not required at this time. GEETHA Marques.                   Discharge Codes    No documentation.                 Expected Discharge Date and Time       Expected Discharge Date Expected Discharge Time    Dec 17, 2023               SRINATH Feldman    "

## 2024-01-02 ENCOUNTER — POSTPARTUM VISIT (OUTPATIENT)
Dept: OBSTETRICS AND GYNECOLOGY | Facility: CLINIC | Age: 29
End: 2024-01-02
Payer: OTHER GOVERNMENT

## 2024-01-02 VITALS
DIASTOLIC BLOOD PRESSURE: 80 MMHG | BODY MASS INDEX: 27.16 KG/M2 | SYSTOLIC BLOOD PRESSURE: 135 MMHG | WEIGHT: 163 LBS | HEIGHT: 65 IN

## 2024-01-02 DIAGNOSIS — O14.03 MILD PRE-ECLAMPSIA IN THIRD TRIMESTER: ICD-10-CM

## 2024-01-02 PROCEDURE — 0503F POSTPARTUM CARE VISIT: CPT | Performed by: NURSE PRACTITIONER

## 2024-01-02 RX ORDER — SERTRALINE HYDROCHLORIDE 25 MG/1
25 TABLET, FILM COATED ORAL DAILY
Qty: 30 TABLET | Refills: 1 | Status: SHIPPED | OUTPATIENT
Start: 2024-01-02

## 2024-01-02 NOTE — PROGRESS NOTES
"Chief Complaint   Patient presents with    Postpartum Care     2wks pp BP check         SUBJECTIVE:   Jessica Chin is a 28 y.o.  who presents s/p  Spontaneous Vaginal Delivery on 12/15/23. Her pregnancy was complicated by mild preeclampsia. Her postpartum hospitalization was uncomplicated. She was started on labetalol 100 mg but stopped this on her own because she felt like this medication was interfering with her babies sleep cycle. She denies HA, vision changes, or RUQ pain. She is tearful today when asked about her mood. She has hx depression and was treated with lexapro in the past. She reports depressed mood. Denies SI or HI. She asks to start zoloft today. She does not have help at home and is getting little sleep. She does go to her parents on the weekend and they are able to help her. She is established with CBT currently.     Bowel and bladder function have returned to normal  Bleeding is scant  She is breast and bottle feeding.     This is my first time meeting Jessica Chin  She is a patient of Dr. Arreolas.  Past Medical History:   Diagnosis Date    Abnormal Pap smear of cervix     Preeclampsia      Past Surgical History:   Procedure Laterality Date    TONSILLECTOMY      WISDOM TOOTH EXTRACTION       Social History     Tobacco Use    Smoking status: Never    Smokeless tobacco: Never   Vaping Use    Vaping Use: Some days    Substances: Nicotine   Substance Use Topics    Alcohol use: No    Drug use: Not Currently     Types: Marijuana     Family History   Problem Relation Age of Onset    Polycystic ovary syndrome Sister     Diabetes Paternal Grandmother     Diabetes Paternal Grandfather        Patient Active Problem List   Diagnosis    Mild pre-eclampsia in third trimester     (spontaneous vaginal delivery)        OBJECTIVE:   /80   Ht 165.1 cm (65\")   Wt 73.9 kg (163 lb)   Breastfeeding Yes   BMI 27.12 kg/m²      Physical Exam  Constitutional:       General: She is not in acute " distress.     Appearance: Normal appearance. She is not ill-appearing, toxic-appearing or diaphoretic.   Cardiovascular:      Rate and Rhythm: Normal rate.   Pulmonary:      Effort: Pulmonary effort is normal.   Abdominal:      General: There is no distension.      Palpations: Abdomen is soft. There is no mass.      Tenderness: There is no abdominal tenderness. There is no guarding.      Hernia: No hernia is present.   Musculoskeletal:         General: Normal range of motion.      Cervical back: Normal range of motion.   Neurological:      General: No focal deficit present.      Mental Status: She is alert and oriented to person, place, and time.      Cranial Nerves: No cranial nerve deficit.   Skin:     General: Skin is warm and dry.   Psychiatric:         Mood and Affect: Mood normal.         Behavior: Behavior normal.         Thought Content: Thought content normal.         Judgment: Judgment normal.   Vitals and nursing note reviewed.         Lab Results   Component Value Date    WBC 14.86 (H) 2023    HGB 11.1 (L) 2023    HCT 31.7 (L) 2023    MCV 91.1 2023     2023        ASSESSMENT:   Diagnoses and all orders for this visit:    1. Postpartum follow-up (Primary)    2.  (spontaneous vaginal delivery)    3. Mild pre-eclampsia in third trimester    4. Postpartum depression  -     sertraline (Zoloft) 25 MG tablet; Take 1 tablet by mouth Daily.  Dispense: 30 tablet; Refill: 1      PLAN:   Doing well postpartum, however she is having depressed mood. We discussed the difference between Baby Blues vs depression. Reviewed red flags. Encouraged the pt to sleep when baby sleeps which she has not been doing. Encouraged to continue CBT and reach out to MH provider if she has not already done so. Discussed zoloft, uses & side effects. Recommend f/u in 2 weeks to re-evaluate mood. Call sooner with worsening symptoms. Advised to go to ED with SI or HI  Baby is doing well  BP mild range,  she has stopped taking her labetalol. Will re-evaluate in 2 weeks  At this time, continue pelvic rest and lifting precautions.  Reviewed last pap smear 5/2023 NIL    Return in about 2 weeks (around 1/16/2024) for Postpartum f/u , With Dr Lion.    Hawa Plaza, APRN  1/2/2024  17:21 EST

## 2024-01-25 ENCOUNTER — POSTPARTUM VISIT (OUTPATIENT)
Dept: OBSTETRICS AND GYNECOLOGY | Facility: CLINIC | Age: 29
End: 2024-01-25
Payer: OTHER GOVERNMENT

## 2024-01-25 VITALS
BODY MASS INDEX: 26.49 KG/M2 | DIASTOLIC BLOOD PRESSURE: 76 MMHG | SYSTOLIC BLOOD PRESSURE: 130 MMHG | HEIGHT: 65 IN | WEIGHT: 159 LBS

## 2024-01-25 DIAGNOSIS — Z78.0 POSTMENOPAUSE: Primary | ICD-10-CM

## 2024-01-25 DIAGNOSIS — O14.03 MILD PRE-ECLAMPSIA IN THIRD TRIMESTER: ICD-10-CM

## 2024-01-25 DIAGNOSIS — R45.89 DEPRESSED MOOD: ICD-10-CM

## 2024-01-25 PROCEDURE — 0503F POSTPARTUM CARE VISIT: CPT | Performed by: NURSE PRACTITIONER

## 2024-01-25 RX ORDER — SERTRALINE HYDROCHLORIDE 25 MG/1
25 TABLET, FILM COATED ORAL DAILY
Qty: 90 TABLET | Refills: 3 | Status: SHIPPED | OUTPATIENT
Start: 2024-01-25

## 2024-01-25 NOTE — PROGRESS NOTES
"Chief Complaint   Patient presents with    Postpartum Care     Patient is here today for a postpartum visit- delivered 12/15/2023- vag- baby girl.         SUBJECTIVE:   Jessica Chin is a 28 y.o.  who presents s/p Spontaneous Vaginal Delivery on 12/15/2023. Her pregnancy was complicated by mild preeclampsia. Her postpartum course was uncomplicated. She was seen last 24 and had stopped labetalol prior to that visit. She was also started on zoloft for depressed mood. Today she is smiling and reports her mood is significantly improved since her last visit. She is tolerating zoloft well. She is established with CBT.  She has not returned to menses. She has not returned to intercourse since delivery.     Bowel and bladder function have returned to normal  bottle feeding    Past Medical History:   Diagnosis Date    Abnormal Pap smear of cervix     Preeclampsia      Past Surgical History:   Procedure Laterality Date    TONSILLECTOMY      WISDOM TOOTH EXTRACTION       Social History     Tobacco Use    Smoking status: Never    Smokeless tobacco: Never   Vaping Use    Vaping Use: Some days    Substances: Nicotine   Substance Use Topics    Alcohol use: No    Drug use: Not Currently     Types: Marijuana     Family History   Problem Relation Age of Onset    Polycystic ovary syndrome Sister     Diabetes Paternal Grandmother     Diabetes Paternal Grandfather        Patient Active Problem List   Diagnosis    Mild pre-eclampsia in third trimester     (spontaneous vaginal delivery)        OBJECTIVE:   /76   Ht 165.1 cm (65\")   Wt 72.1 kg (159 lb)   Breastfeeding No Comment: stopped 2 weeks ago  BMI 26.46 kg/m²        Physical Exam  Constitutional:       General: She is not in acute distress.     Appearance: Normal appearance. She is not ill-appearing, toxic-appearing or diaphoretic.   Genitourinary:      Bladder and urethral meatus normal.      No lesions in the vagina.      Right Labia: No rash, " tenderness, lesions, skin changes or Bartholin's cyst.     Left Labia: No tenderness, lesions, skin changes, Bartholin's cyst or rash.     No labial fusion noted.      No inguinal adenopathy present in the right or left side.     No vaginal discharge, erythema, tenderness, bleeding, ulceration or granulation tissue.      No vaginal prolapse present.     No vaginal atrophy present.       Right Adnexa: not tender, not full, not palpable, no mass present and not absent.     Left Adnexa: not tender, not full, not palpable, no mass present and not absent.     No cervical motion tenderness, discharge, friability, lesion, polyp, nabothian cyst or eversion.      Uterus is not enlarged, fixed, tender, irregular or prolapsed.      No uterine mass detected.  Abdominal:      General: There is no distension.      Palpations: Abdomen is soft. There is no mass.      Tenderness: There is no abdominal tenderness. There is no guarding.      Hernia: No hernia is present. There is no hernia in the left inguinal area or right inguinal area.   Lymphadenopathy:      Lower Body: No right inguinal adenopathy. No left inguinal adenopathy.   Neurological:      General: No focal deficit present.      Mental Status: She is alert and oriented to person, place, and time.      Cranial Nerves: No cranial nerve deficit.   Psychiatric:         Mood and Affect: Mood normal.         Behavior: Behavior normal.         Thought Content: Thought content normal.         Judgment: Judgment normal.   Vitals and nursing note reviewed.         Lab Results   Component Value Date    WBC 14.86 (H) 2023    HGB 11.1 (L) 2023    HCT 31.7 (L) 2023    MCV 91.1 2023     2023        ASSESSMENT:  Diagnoses and all orders for this visit:    1. Postmenopause (Primary)    2.  (spontaneous vaginal delivery)    3. Mild pre-eclampsia in third trimester    4. Depressed mood    5. Postpartum depression  -     sertraline (Zoloft) 25 MG  tablet; Take 1 tablet by mouth Daily.  Dispense: 90 tablet; Refill: 3         PLAN:   Doing very well postpartum  Baby is doing well  Reviewed placental pathology   Contraception declines at this time  At this time, may resume normal activity including intercourse and lifting.  Reviewed normal precautions.  Reviewed last pap smear 5/9/2023 NIL  Recommended follow up for annual exam or sooner with problems  She is established with CBT, mood is improved with zoloft. Zoloft refilled  BP normal range    Return in about 1 year (around 1/25/2025) for Annual physical.    Hawa Plaza, APRN  1/25/2024  19:24 EST